# Patient Record
Sex: FEMALE | Race: WHITE | NOT HISPANIC OR LATINO | Employment: OTHER | URBAN - METROPOLITAN AREA
[De-identification: names, ages, dates, MRNs, and addresses within clinical notes are randomized per-mention and may not be internally consistent; named-entity substitution may affect disease eponyms.]

---

## 2023-08-23 ENCOUNTER — HOSPITAL ENCOUNTER (OUTPATIENT)
Dept: DATA CONVERSION | Facility: HOSPITAL | Age: 73
Discharge: HOME | End: 2023-08-23
Payer: MEDICARE

## 2023-08-23 DIAGNOSIS — J02.8 ACUTE PHARYNGITIS DUE TO OTHER SPECIFIED ORGANISMS: ICD-10-CM

## 2023-08-23 LAB
BACTERIA SPEC CULT: NORMAL
REPORT STATUS -LH SQ DATA CONVERSION: NORMAL
SERVICE CMNT-IMP: NORMAL
SPECIMEN SOURCE: NORMAL

## 2023-09-15 VITALS
BODY MASS INDEX: 38.16 KG/M2 | SYSTOLIC BLOOD PRESSURE: 152 MMHG | HEART RATE: 106 BPM | WEIGHT: 215.4 LBS | DIASTOLIC BLOOD PRESSURE: 78 MMHG | RESPIRATION RATE: 16 BRPM | HEIGHT: 63 IN | OXYGEN SATURATION: 95 % | TEMPERATURE: 100.8 F

## 2023-11-03 ENCOUNTER — TELEPHONE (OUTPATIENT)
Dept: PRIMARY CARE | Facility: CLINIC | Age: 73
End: 2023-11-03
Payer: MEDICARE

## 2023-11-03 NOTE — TELEPHONE ENCOUNTER
Pt called requesting zpak due to sinus congestion, ears hurting and blocked and sore throat. Dr. Tang stated ok. Rx verbally called into [facility] pt aware

## 2023-12-20 ENCOUNTER — OFFICE VISIT (OUTPATIENT)
Dept: DERMATOLOGY | Facility: CLINIC | Age: 73
End: 2023-12-20
Payer: MEDICARE

## 2023-12-20 DIAGNOSIS — L57.0 ACTINIC KERATOSIS: ICD-10-CM

## 2023-12-20 DIAGNOSIS — D48.5 NEOPLASM OF UNCERTAIN BEHAVIOR OF SKIN: Primary | ICD-10-CM

## 2023-12-20 DIAGNOSIS — L82.1 SEBORRHEIC KERATOSIS: ICD-10-CM

## 2023-12-20 DIAGNOSIS — D22.5 MELANOCYTIC NEVUS OF TRUNK: ICD-10-CM

## 2023-12-20 DIAGNOSIS — L57.8 DIFFUSE PHOTODAMAGE OF SKIN: ICD-10-CM

## 2023-12-20 DIAGNOSIS — L21.9 SEBORRHEIC DERMATITIS: ICD-10-CM

## 2023-12-20 PROCEDURE — 1126F AMNT PAIN NOTED NONE PRSNT: CPT | Performed by: DERMATOLOGY

## 2023-12-20 PROCEDURE — 99204 OFFICE O/P NEW MOD 45 MIN: CPT | Performed by: DERMATOLOGY

## 2023-12-20 PROCEDURE — 1159F MED LIST DOCD IN RCRD: CPT | Performed by: DERMATOLOGY

## 2023-12-20 PROCEDURE — 17003 DESTRUCT PREMALG LES 2-14: CPT | Performed by: DERMATOLOGY

## 2023-12-20 PROCEDURE — 1125F AMNT PAIN NOTED PAIN PRSNT: CPT | Performed by: DERMATOLOGY

## 2023-12-20 PROCEDURE — 17000 DESTRUCT PREMALG LESION: CPT | Performed by: DERMATOLOGY

## 2023-12-20 PROCEDURE — 88305 TISSUE EXAM BY PATHOLOGIST: CPT | Mod: TC,DER | Performed by: DERMATOLOGY

## 2023-12-20 PROCEDURE — 88305 TISSUE EXAM BY PATHOLOGIST: CPT | Performed by: DERMATOLOGY

## 2023-12-20 PROCEDURE — 11301 SHAVE SKIN LESION 0.6-1.0 CM: CPT | Performed by: DERMATOLOGY

## 2023-12-20 PROCEDURE — 1036F TOBACCO NON-USER: CPT | Performed by: DERMATOLOGY

## 2023-12-20 RX ORDER — KETOCONAZOLE 20 MG/G
CREAM TOPICAL 2 TIMES DAILY
Qty: 60 G | Refills: 11 | Status: SHIPPED | OUTPATIENT
Start: 2023-12-20 | End: 2024-02-27 | Stop reason: ALTCHOICE

## 2023-12-20 RX ORDER — ESTRADIOL 10 UG/1
INSERT VAGINAL
COMMUNITY
Start: 2019-03-05

## 2023-12-20 RX ORDER — SULFAMETHOXAZOLE AND TRIMETHOPRIM 800; 160 MG/1; MG/1
TABLET ORAL EVERY 24 HOURS
COMMUNITY
Start: 2010-01-22

## 2023-12-20 RX ORDER — FEXOFENADINE HCL 60 MG
1 TABLET ORAL DAILY
COMMUNITY
Start: 2016-09-07

## 2023-12-20 RX ORDER — ASPIRIN 325 MG
50000 TABLET, DELAYED RELEASE (ENTERIC COATED) ORAL
COMMUNITY
Start: 2023-12-07

## 2023-12-20 RX ORDER — EPINEPHRINE 0.3 MG/.3ML
0.3 INJECTION INTRAMUSCULAR
COMMUNITY
Start: 2017-03-30

## 2023-12-20 RX ORDER — TRIAMCINOLONE ACETONIDE 5 MG/G
CREAM TOPICAL EVERY 12 HOURS
COMMUNITY
Start: 2023-07-24

## 2023-12-20 ASSESSMENT — DERMATOLOGY PATIENT ASSESSMENT
ARE YOU ON BIRTH CONTROL: NO
DO YOU USE SUNSCREEN: DAILY
DO YOU USE A TANNING BED: NO
DO YOU HAVE ANY NEW OR CHANGING LESIONS: NO
ARE YOU TRYING TO GET PREGNANT: NO
DO YOU HAVE IRREGULAR MENSTRUAL CYCLES: NO

## 2023-12-20 ASSESSMENT — DERMATOLOGY QUALITY OF LIFE (QOL) ASSESSMENT: ARE THERE EXCLUSIONS OR EXCEPTIONS FOR THE QUALITY OF LIFE ASSESSMENT: NO

## 2023-12-20 ASSESSMENT — PATIENT GLOBAL ASSESSMENT (PGA): WHAT IS THE PGA: PATIENT GLOBAL ASSESSMENT:  3 - MODERATE

## 2023-12-22 LAB
LABORATORY COMMENT REPORT: NORMAL
PATH REPORT.FINAL DX SPEC: NORMAL
PATH REPORT.GROSS SPEC: NORMAL
PATH REPORT.MICROSCOPIC SPEC OTHER STN: NORMAL
PATH REPORT.RELEVANT HX SPEC: NORMAL
PATH REPORT.TOTAL CANCER: NORMAL

## 2023-12-26 ENCOUNTER — OFFICE VISIT (OUTPATIENT)
Dept: PRIMARY CARE | Facility: CLINIC | Age: 73
End: 2023-12-26
Payer: MEDICARE

## 2023-12-26 VITALS — OXYGEN SATURATION: 98 % | HEART RATE: 90 BPM | SYSTOLIC BLOOD PRESSURE: 168 MMHG | DIASTOLIC BLOOD PRESSURE: 100 MMHG

## 2023-12-26 DIAGNOSIS — R30.0 DYSURIA: Primary | ICD-10-CM

## 2023-12-26 DIAGNOSIS — N30.01 ACUTE CYSTITIS WITH HEMATURIA: ICD-10-CM

## 2023-12-26 LAB
POC APPEARANCE, URINE: ABNORMAL
POC BILIRUBIN, URINE: ABNORMAL
POC BLOOD, URINE: ABNORMAL
POC COLOR, URINE: ABNORMAL
POC GLUCOSE, URINE: ABNORMAL MG/DL
POC KETONES, URINE: NEGATIVE MG/DL
POC LEUKOCYTES, URINE: ABNORMAL
POC NITRITE,URINE: POSITIVE
POC PH, URINE: 5.5 PH
POC PROTEIN, URINE: ABNORMAL MG/DL
POC SPECIFIC GRAVITY, URINE: >=1.03
POC UROBILINOGEN, URINE: 1 EU/DL

## 2023-12-26 PROCEDURE — 1125F AMNT PAIN NOTED PAIN PRSNT: CPT | Performed by: PHYSICIAN ASSISTANT

## 2023-12-26 PROCEDURE — 99213 OFFICE O/P EST LOW 20 MIN: CPT | Performed by: PHYSICIAN ASSISTANT

## 2023-12-26 PROCEDURE — 1036F TOBACCO NON-USER: CPT | Performed by: PHYSICIAN ASSISTANT

## 2023-12-26 PROCEDURE — 81003 URINALYSIS AUTO W/O SCOPE: CPT | Performed by: PHYSICIAN ASSISTANT

## 2023-12-26 PROCEDURE — 1160F RVW MEDS BY RX/DR IN RCRD: CPT | Performed by: PHYSICIAN ASSISTANT

## 2023-12-26 PROCEDURE — 1159F MED LIST DOCD IN RCRD: CPT | Performed by: PHYSICIAN ASSISTANT

## 2023-12-26 RX ORDER — NITROFURANTOIN 25; 75 MG/1; MG/1
100 CAPSULE ORAL 2 TIMES DAILY
Qty: 14 CAPSULE | Refills: 0 | Status: SHIPPED | OUTPATIENT
Start: 2023-12-26 | End: 2023-12-27 | Stop reason: SINTOL

## 2023-12-26 ASSESSMENT — LIFESTYLE VARIABLES
HOW OFTEN DO YOU HAVE SIX OR MORE DRINKS ON ONE OCCASION: NEVER
HOW MANY STANDARD DRINKS CONTAINING ALCOHOL DO YOU HAVE ON A TYPICAL DAY: PATIENT DOES NOT DRINK
SKIP TO QUESTIONS 9-10: 1
AUDIT-C TOTAL SCORE: 0
HOW OFTEN DO YOU HAVE A DRINK CONTAINING ALCOHOL: NEVER

## 2023-12-26 ASSESSMENT — ENCOUNTER SYMPTOMS
LOSS OF SENSATION IN FEET: 0
OCCASIONAL FEELINGS OF UNSTEADINESS: 0
DEPRESSION: 0

## 2023-12-26 ASSESSMENT — PATIENT HEALTH QUESTIONNAIRE - PHQ9
1. LITTLE INTEREST OR PLEASURE IN DOING THINGS: NOT AT ALL
SUM OF ALL RESPONSES TO PHQ9 QUESTIONS 1 AND 2: 0
2. FEELING DOWN, DEPRESSED OR HOPELESS: NOT AT ALL

## 2023-12-26 ASSESSMENT — PAIN SCALES - GENERAL: PAINLEVEL: 10-WORST PAIN EVER

## 2023-12-26 NOTE — PROGRESS NOTES
Subjective   Patient ID:   Odette Nicholson is a 73 y.o. female who presents for UTI.  HPI  Dr. Tang patient.  Here with acute symptoms:  Symptoms x a couple of days.  Frequency.  Burning.  States she knows she has a UTI.  Urine today shows leukocytes, nitrites, blood.  Taking Azo.    Review of Systems  12 point review of systems negative unless stated above in HPI    Vitals:    12/26/23 1130   BP: (!) 168/100   Pulse: 90   SpO2: 98%     Physical Exam  General: Alert and oriented, well nourished, no acute distress.  Lungs: Clear to auscultation, non-labored respiration.  Heart: Normal rate, regular rhythm, no murmur, gallop or edema.  Neurologic: Awake, alert, and oriented X3, CN II-XII intact.  Psychiatric: Cooperative, appropriate mood and affect.    Assessment/Plan   You appear to have a urinary tract infection based on your urine results.  I have sent in the antibiotic Macrobid to your pharmacy.  Unfortunately, there is not enough urine to send for a culture.  Increase water intake.  Call if symptoms worsen or do not improve.    F/u  With Dr. Tang  Diagnoses and all orders for this visit:  Dysuria  -     POCT UA Automated manually resulted  Acute cystitis with hematuria  -     nitrofurantoin, macrocrystal-monohydrate, (Macrobid) 100 mg capsule; Take 1 capsule (100 mg) by mouth 2 times a day for 7 days.

## 2023-12-27 DIAGNOSIS — N30.01 ACUTE CYSTITIS WITH HEMATURIA: Primary | ICD-10-CM

## 2023-12-27 RX ORDER — TETRACYCLINE HYDROCHLORIDE 500 MG/1
500 CAPSULE ORAL 2 TIMES DAILY
Qty: 14 CAPSULE | Refills: 0 | Status: SHIPPED | OUTPATIENT
Start: 2023-12-27 | End: 2024-01-03

## 2024-01-05 DIAGNOSIS — E07.9 THYROID LUMP: ICD-10-CM

## 2024-01-10 ENCOUNTER — TELEPHONE (OUTPATIENT)
Dept: DERMATOLOGY | Facility: CLINIC | Age: 74
End: 2024-01-10

## 2024-01-10 ENCOUNTER — OFFICE VISIT (OUTPATIENT)
Dept: PRIMARY CARE | Facility: CLINIC | Age: 74
End: 2024-01-10
Payer: MEDICARE

## 2024-01-10 VITALS
OXYGEN SATURATION: 93 % | RESPIRATION RATE: 16 BRPM | HEIGHT: 63 IN | WEIGHT: 210 LBS | BODY MASS INDEX: 37.21 KG/M2 | HEART RATE: 74 BPM

## 2024-01-10 DIAGNOSIS — Z88.8 ALLERGY TO IODINE: ICD-10-CM

## 2024-01-10 DIAGNOSIS — C44.529 SQUAMOUS CELL CARCINOMA OF BACK: ICD-10-CM

## 2024-01-10 DIAGNOSIS — R73.9 HYPERGLYCEMIA: ICD-10-CM

## 2024-01-10 DIAGNOSIS — R10.84 GENERALIZED ABDOMINAL PAIN: Primary | ICD-10-CM

## 2024-01-10 DIAGNOSIS — E03.9 HYPOTHYROIDISM (ACQUIRED): ICD-10-CM

## 2024-01-10 DIAGNOSIS — Z13.6 SCREENING FOR CARDIOVASCULAR CONDITION: ICD-10-CM

## 2024-01-10 DIAGNOSIS — R35.0 URINARY FREQUENCY: ICD-10-CM

## 2024-01-10 LAB
POC APPEARANCE, URINE: CLEAR
POC BILIRUBIN, URINE: NEGATIVE
POC BLOOD, URINE: NEGATIVE
POC COLOR, URINE: YELLOW
POC GLUCOSE, URINE: NEGATIVE MG/DL
POC KETONES, URINE: NEGATIVE MG/DL
POC LEUKOCYTES, URINE: NEGATIVE
POC NITRITE,URINE: NEGATIVE
POC PH, URINE: 5 PH
POC PROTEIN, URINE: NEGATIVE MG/DL
POC SPECIFIC GRAVITY, URINE: 1.02
POC UROBILINOGEN, URINE: 1 EU/DL

## 2024-01-10 PROCEDURE — 1159F MED LIST DOCD IN RCRD: CPT | Performed by: INTERNAL MEDICINE

## 2024-01-10 PROCEDURE — 1126F AMNT PAIN NOTED NONE PRSNT: CPT | Performed by: INTERNAL MEDICINE

## 2024-01-10 PROCEDURE — 1036F TOBACCO NON-USER: CPT | Performed by: INTERNAL MEDICINE

## 2024-01-10 PROCEDURE — 99214 OFFICE O/P EST MOD 30 MIN: CPT | Performed by: INTERNAL MEDICINE

## 2024-01-10 RX ORDER — PREDNISONE 10 MG/1
40 TABLET ORAL DAILY
Qty: 8 TABLET | Refills: 0 | Status: SHIPPED | OUTPATIENT
Start: 2024-01-10 | End: 2024-01-12

## 2024-01-10 ASSESSMENT — ENCOUNTER SYMPTOMS
SINUS PAIN: 0
RHINORRHEA: 0
DIZZINESS: 0
CHILLS: 0
LOSS OF SENSATION IN FEET: 0
JOINT SWELLING: 0
ROS SKIN COMMENTS: MOLE ON BACK
COUGH: 0
NERVOUS/ANXIOUS: 0
CONSTIPATION: 0
PALPITATIONS: 0
FATIGUE: 0
FREQUENCY: 1
VOMITING: 0
APPETITE CHANGE: 0
DIARRHEA: 0
NAUSEA: 0
FEVER: 0
DIFFICULTY URINATING: 0
DEPRESSION: 0
SORE THROAT: 0
SLEEP DISTURBANCE: 0
OCCASIONAL FEELINGS OF UNSTEADINESS: 0
ARTHRALGIAS: 0
SHORTNESS OF BREATH: 0
ABDOMINAL PAIN: 1
WEAKNESS: 0
HEADACHES: 0
HEMATURIA: 0

## 2024-01-10 ASSESSMENT — PATIENT HEALTH QUESTIONNAIRE - PHQ9
1. LITTLE INTEREST OR PLEASURE IN DOING THINGS: NOT AT ALL
2. FEELING DOWN, DEPRESSED OR HOPELESS: NOT AT ALL
SUM OF ALL RESPONSES TO PHQ9 QUESTIONS 1 AND 2: 0

## 2024-01-10 ASSESSMENT — PAIN SCALES - GENERAL: PAINLEVEL: 0-NO PAIN

## 2024-01-10 NOTE — TELEPHONE ENCOUNTER
Pt left message that she wants her biopsy report sent to Dr Denilson See , Her PCP recommended Dr See to do surgery ?? Her appt is on 01/05/24  in [place] , I returned call to pt to let her know that Dr See is not a Mohs surgeon , she is aware and stated that he is going to do ED*C and asked if she is going to follow up with Dr Charles she stated no because Dr Tolbert office is much closer to her home . I recommended that Dr Tolbert office fax over request for bx report .    Patients PCP is Merry Tang MD --called Dr Tolbert office spoke with Shasha , she said they will fax over all information of pt treatment after her visit ..

## 2024-01-10 NOTE — PROGRESS NOTES
"Subjective   Patient ID: Odette Nicholson is a 73 y.o. female who presents for discuss dermatology skin biopsy results. She has squamous cell on her back and would like referral to new dermatologist for removal. Patient reports lower abdominal pressure and pain. Moves bowels daily. Hx of cholecystectomy and hysterectomy. She also reports gaining weight but all of her clothes still fit. Urinates frequently but drinks a lot of water. Denies nocturia.    Diagnostics Reviewed:  Labs Reviewed: 1/2024 UA normal         Review of Systems   Constitutional:  Negative for appetite change, chills, fatigue and fever.        Weight gain   HENT:  Negative for ear pain, rhinorrhea, sinus pain and sore throat.    Eyes:  Negative for visual disturbance.   Respiratory:  Negative for cough and shortness of breath.    Cardiovascular:  Negative for chest pain and palpitations.   Gastrointestinal:  Positive for abdominal pain. Negative for constipation, diarrhea, nausea and vomiting.   Genitourinary:  Positive for frequency. Negative for difficulty urinating and hematuria.   Musculoskeletal:  Negative for arthralgias and joint swelling.   Skin:  Negative for rash.        Mole on back   Neurological:  Negative for dizziness, weakness and headaches.   Psychiatric/Behavioral:  Negative for sleep disturbance. The patient is not nervous/anxious.        Objective   Pulse 74   Resp 16   Ht 1.6 m (5' 3\")   Wt 95.3 kg (210 lb)   SpO2 93%   BMI 37.20 kg/m²     Physical Exam  Eyes:      Conjunctiva/sclera: Conjunctivae normal.      Pupils: Pupils are equal, round, and reactive to light.   Neck:      Thyroid: No thyromegaly.      Vascular: No carotid bruit.   Cardiovascular:      Rate and Rhythm: Regular rhythm.      Heart sounds: Normal heart sounds.   Pulmonary:      Breath sounds: Normal breath sounds.   Abdominal:      General: Bowel sounds are normal.      Palpations: Abdomen is soft. There is no hepatomegaly.      Tenderness: There is " abdominal tenderness.   Lymphadenopathy:      Cervical: No cervical adenopathy.   Skin:     General: Skin is warm.      Comments: Suspicious mole on back   Neurological:      Mental Status: She is alert and oriented to person, place, and time.      Gait: Gait is intact.   Psychiatric:         Mood and Affect: Mood and affect normal.         Behavior: Behavior normal. Behavior is cooperative.         Cognition and Memory: Cognition normal.         Assessment/Plan   Diagnoses and all orders for this visit:  Generalized abdominal pain  -     CT abdomen pelvis w IV contrast; Future  -     CBC and Auto Differential; Future  -     Comprehensive Metabolic Panel; Future  -     Hemoglobin A1C; Future  -     TSH with reflex to Free T4 if abnormal; Future  -     Lipid Panel; Future  Urinary frequency  -     POCT UA (nonautomated) manually resulted  -     CBC and Auto Differential; Future  -     Comprehensive Metabolic Panel; Future  -     Hemoglobin A1C; Future  -     TSH with reflex to Free T4 if abnormal; Future  -     Lipid Panel; Future  Hypothyroidism (acquired)  -     CBC and Auto Differential; Future  -     Comprehensive Metabolic Panel; Future  -     Hemoglobin A1C; Future  -     TSH with reflex to Free T4 if abnormal; Future  -     Lipid Panel; Future  Screening for cardiovascular condition  -     CBC and Auto Differential; Future  -     Comprehensive Metabolic Panel; Future  -     Hemoglobin A1C; Future  -     TSH with reflex to Free T4 if abnormal; Future  -     Lipid Panel; Future  Hyperglycemia  -     CBC and Auto Differential; Future  -     Comprehensive Metabolic Panel; Future  -     Hemoglobin A1C; Future  -     TSH with reflex to Free T4 if abnormal; Future  -     Lipid Panel; Future  Squamous cell carcinoma of back  -     Referral to Dermatology      Scribe Attestation  By signing my name below, Cheyanne MELARA Scribe   attest that this documentation has been prepared under the direction and in the presence  of Angela Tang MD.

## 2024-01-11 ENCOUNTER — APPOINTMENT (OUTPATIENT)
Dept: RADIOLOGY | Facility: CLINIC | Age: 74
End: 2024-01-11
Payer: MEDICARE

## 2024-01-11 ENCOUNTER — APPOINTMENT (OUTPATIENT)
Dept: RADIOLOGY | Facility: HOSPITAL | Age: 74
End: 2024-01-11
Payer: MEDICARE

## 2024-01-16 ENCOUNTER — LAB (OUTPATIENT)
Dept: LAB | Facility: LAB | Age: 74
End: 2024-01-16
Payer: MEDICARE

## 2024-01-16 DIAGNOSIS — R35.0 URINARY FREQUENCY: ICD-10-CM

## 2024-01-16 DIAGNOSIS — E03.9 HYPOTHYROIDISM (ACQUIRED): ICD-10-CM

## 2024-01-16 DIAGNOSIS — R10.84 GENERALIZED ABDOMINAL PAIN: ICD-10-CM

## 2024-01-16 DIAGNOSIS — Z13.6 SCREENING FOR CARDIOVASCULAR CONDITION: ICD-10-CM

## 2024-01-16 DIAGNOSIS — R73.9 HYPERGLYCEMIA: ICD-10-CM

## 2024-01-16 LAB
ALBUMIN SERPL-MCNC: 4.5 G/DL (ref 3.5–5)
ALP BLD-CCNC: 124 U/L (ref 35–125)
ALT SERPL-CCNC: 19 U/L (ref 5–40)
ANION GAP SERPL CALC-SCNC: 15 MMOL/L
AST SERPL-CCNC: 19 U/L (ref 5–40)
BASOPHILS # BLD AUTO: 0.02 X10*3/UL (ref 0–0.1)
BASOPHILS NFR BLD AUTO: 0.3 %
BILIRUB SERPL-MCNC: 0.5 MG/DL (ref 0.1–1.2)
BUN SERPL-MCNC: 15 MG/DL (ref 8–25)
CALCIUM SERPL-MCNC: 9.3 MG/DL (ref 8.5–10.4)
CHLORIDE SERPL-SCNC: 102 MMOL/L (ref 97–107)
CHOLEST SERPL-MCNC: 209 MG/DL (ref 133–200)
CHOLEST/HDLC SERPL: 3.4 {RATIO}
CO2 SERPL-SCNC: 25 MMOL/L (ref 24–31)
CREAT SERPL-MCNC: 0.9 MG/DL (ref 0.4–1.6)
EGFRCR SERPLBLD CKD-EPI 2021: 68 ML/MIN/1.73M*2
EOSINOPHIL # BLD AUTO: 0.06 X10*3/UL (ref 0–0.4)
EOSINOPHIL NFR BLD AUTO: 1 %
ERYTHROCYTE [DISTWIDTH] IN BLOOD BY AUTOMATED COUNT: 15.6 % (ref 11.5–14.5)
EST. AVERAGE GLUCOSE BLD GHB EST-MCNC: 114 MG/DL
GLUCOSE SERPL-MCNC: 100 MG/DL (ref 65–99)
HBA1C MFR BLD: 5.6 %
HCT VFR BLD AUTO: 44.8 % (ref 36–46)
HDLC SERPL-MCNC: 62 MG/DL
HGB BLD-MCNC: 14.4 G/DL (ref 12–16)
IMM GRANULOCYTES # BLD AUTO: 0.02 X10*3/UL (ref 0–0.5)
IMM GRANULOCYTES NFR BLD AUTO: 0.3 % (ref 0–0.9)
LDLC SERPL CALC-MCNC: 121 MG/DL (ref 65–130)
LYMPHOCYTES # BLD AUTO: 1.73 X10*3/UL (ref 0.8–3)
LYMPHOCYTES NFR BLD AUTO: 30.2 %
MCH RBC QN AUTO: 25.9 PG (ref 26–34)
MCHC RBC AUTO-ENTMCNC: 32.1 G/DL (ref 32–36)
MCV RBC AUTO: 81 FL (ref 80–100)
MONOCYTES # BLD AUTO: 0.62 X10*3/UL (ref 0.05–0.8)
MONOCYTES NFR BLD AUTO: 10.8 %
NEUTROPHILS # BLD AUTO: 3.28 X10*3/UL (ref 1.6–5.5)
NEUTROPHILS NFR BLD AUTO: 57.4 %
NRBC BLD-RTO: 0 /100 WBCS (ref 0–0)
PLATELET # BLD AUTO: 256 X10*3/UL (ref 150–450)
POTASSIUM SERPL-SCNC: 4.1 MMOL/L (ref 3.4–5.1)
PROT SERPL-MCNC: 6.8 G/DL (ref 5.9–7.9)
RBC # BLD AUTO: 5.56 X10*6/UL (ref 4–5.2)
SODIUM SERPL-SCNC: 142 MMOL/L (ref 133–145)
TRIGL SERPL-MCNC: 131 MG/DL (ref 40–150)
TSH SERPL DL<=0.05 MIU/L-ACNC: 1.27 MIU/L (ref 0.27–4.2)
WBC # BLD AUTO: 5.7 X10*3/UL (ref 4.4–11.3)

## 2024-01-16 PROCEDURE — 80061 LIPID PANEL: CPT

## 2024-01-16 PROCEDURE — 36415 COLL VENOUS BLD VENIPUNCTURE: CPT

## 2024-01-16 PROCEDURE — 83036 HEMOGLOBIN GLYCOSYLATED A1C: CPT

## 2024-01-16 PROCEDURE — 85025 COMPLETE CBC W/AUTO DIFF WBC: CPT

## 2024-01-16 PROCEDURE — 80053 COMPREHEN METABOLIC PANEL: CPT

## 2024-01-16 PROCEDURE — 84443 ASSAY THYROID STIM HORMONE: CPT

## 2024-01-17 ENCOUNTER — HOSPITAL ENCOUNTER (OUTPATIENT)
Dept: RADIOLOGY | Facility: HOSPITAL | Age: 74
Discharge: HOME | End: 2024-01-17
Payer: MEDICARE

## 2024-01-17 DIAGNOSIS — R10.84 GENERALIZED ABDOMINAL PAIN: ICD-10-CM

## 2024-01-17 PROCEDURE — 74177 CT ABD & PELVIS W/CONTRAST: CPT

## 2024-01-17 PROCEDURE — 2550000001 HC RX 255 CONTRASTS: Performed by: INTERNAL MEDICINE

## 2024-01-17 RX ADMIN — IOHEXOL 75 ML: 350 INJECTION, SOLUTION INTRAVENOUS at 10:16

## 2024-01-18 NOTE — RESULT ENCOUNTER NOTE
CT abdomen looks okay except she has fatty liver, which can improve with weight loss and limiting calories.  How is she feeling?

## 2024-02-27 ENCOUNTER — OFFICE VISIT (OUTPATIENT)
Dept: ENDOCRINOLOGY | Facility: CLINIC | Age: 74
End: 2024-02-27
Payer: MEDICARE

## 2024-02-27 VITALS
BODY MASS INDEX: 37.7 KG/M2 | HEIGHT: 63 IN | DIASTOLIC BLOOD PRESSURE: 70 MMHG | HEART RATE: 76 BPM | RESPIRATION RATE: 16 BRPM | WEIGHT: 212.8 LBS | SYSTOLIC BLOOD PRESSURE: 120 MMHG

## 2024-02-27 DIAGNOSIS — E89.0 POSTOPERATIVE HYPOTHYROIDISM: ICD-10-CM

## 2024-02-27 DIAGNOSIS — E04.2 MULTINODULAR GOITER: Primary | ICD-10-CM

## 2024-02-27 DIAGNOSIS — R73.03 PREDIABETES: ICD-10-CM

## 2024-02-27 PROCEDURE — 1160F RVW MEDS BY RX/DR IN RCRD: CPT | Performed by: INTERNAL MEDICINE

## 2024-02-27 PROCEDURE — 99204 OFFICE O/P NEW MOD 45 MIN: CPT | Performed by: INTERNAL MEDICINE

## 2024-02-27 PROCEDURE — 1159F MED LIST DOCD IN RCRD: CPT | Performed by: INTERNAL MEDICINE

## 2024-02-27 PROCEDURE — 1126F AMNT PAIN NOTED NONE PRSNT: CPT | Performed by: INTERNAL MEDICINE

## 2024-02-27 PROCEDURE — 1036F TOBACCO NON-USER: CPT | Performed by: INTERNAL MEDICINE

## 2024-02-27 ASSESSMENT — ENCOUNTER SYMPTOMS
COUGH: 0
NAUSEA: 0
SHORTNESS OF BREATH: 0
FEVER: 0
PALPITATIONS: 0
FATIGUE: 0
DIARRHEA: 0
VOMITING: 0
HEADACHES: 0
CHILLS: 0

## 2024-02-27 NOTE — PROGRESS NOTES
Endocrinology New Patient Consult  Subjective   Patient ID: Odette Nicholson is a 73 y.o. female who presents for Hypothyroidism.    PCP: Angela Tang MD    HPI  73-year-old self-referred for evaluation of hypothyroidism also with complaints of weight gain and difficulty losing weight.  She has been hypothyroid since 1992 when she had a hemithyroidectomy for benign nodular disease.  She believes she had a left hemithyroidectomy.  She was initially tried on levothyroxine but has a extreme dairy intolerance so was switched to [place] which she has been doing well on since.  She has been on the same dose of 60 mg daily since that time.  She takes it in the morning on an empty stomach.  She has gained about 20 pounds in the last year but reports that her clothing still fits her which she is confused by.  She tries to watch her eating habits very closely as she and her  work very hard to eat healthy.  She is currently following a gluten-free diet which she does feel better on.  She has been trying to watch her calories but has very limited exercise due to significant back issues.  She has done PT in the past and plans to look into water therapy.  She does not sleep well which is a chronic issue.  She has had some hoarseness on and off and denies any repeat ultrasound since surgery.      Review of Systems   Constitutional:  Negative for chills, fatigue and fever.   Respiratory:  Negative for cough and shortness of breath.    Cardiovascular:  Negative for chest pain and palpitations.   Gastrointestinal:  Negative for diarrhea, nausea and vomiting.   Neurological:  Negative for headaches.           Past Medical History:   Diagnosis Date    Unspecified cataract     Cataracts, both eyes    Vitreous degeneration, right eye     PVD (posterior vitreous detachment), right eye   Fatty liver   hypothyroidism   prediabetes    Past Surgical History:   Procedure Laterality Date    HAND SURGERY  06/04/2013    Hand Surgery                                                                                                                                                           HYSTERECTOMY  06/04/2013    Hysterectomy    TONSILLECTOMY  06/04/2013    Tonsillectomy   Hemithyroidectomy  Cholecystectomy    Social History     Tobacco Use   Smoking Status Never    Passive exposure: Never   Smokeless Tobacco Never      Social History     Substance and Sexual Activity   Alcohol Use Never      Marital status:   Employment: Retired    Family history  Paternal grandfather thyroid issue    Home Meds:  Current Outpatient Medications   Medication Instructions    [place] Thyroid 60 mg tablet Every 24 hours    cholecalciferol (VITAMIN D-3) 50,000 Units, oral, Weekly    EpiPen 2-Eliecer 0.3 mg, intramuscular, As Directed    fexofenadine (Allegra Allergy) 60 mg tablet 1 tablet, oral, Daily    ketoconazole (NIZOral) 2 % cream Topical, 2 times daily, To affected areas of face    triamcinolone (Kenalog) 0.5 % cream Every 12 hours    Vagifem 10 mcg tablet vaginal tablet 1 tablet Vaginal 3 times a Week for 90 day(s)        Allergies   Allergen Reactions    Latex Anaphylaxis     breathing difficulties, rash    Penicillins Anaphylaxis     quit breathing    Shellfish Derived Anaphylaxis    Acetaminophen Unknown    Antipyrine-Benzocaine Swelling    Bacitracin Unknown    Carbamide Peroxide Swelling    Diphenhydramine Hcl Unknown    Ibuprofen Unknown    Iodine Unknown    Nsaids (Non-Steroidal Anti-Inflammatory Drug) Headache    Neomycin Rash    Sulfa (Sulfonamide Antibiotics) Rash     breathing problems and rash        Objective   Vitals:    02/27/24 1303   BP: 120/70   Pulse: 76   Resp: 16      Vitals:    02/27/24 1303   Weight: 96.5 kg (212 lb 12.8 oz)      Body mass index is 37.7 kg/m².   Physical Exam  Constitutional:       Appearance: Normal appearance. She is overweight.   HENT:      Head: Normocephalic and atraumatic.   Neck:      Thyroid: No thyroid mass,  "thyromegaly or thyroid tenderness.      Comments: Difficult to palpate thyroid gland secondary to body habitus and scarring  Cardiovascular:      Rate and Rhythm: Normal rate and regular rhythm.      Heart sounds: No murmur heard.     No gallop.   Pulmonary:      Effort: Pulmonary effort is normal.      Breath sounds: Normal breath sounds.   Abdominal:      Palpations: Abdomen is soft.      Comments: benign   Neurological:      General: No focal deficit present.      Mental Status: She is alert and oriented to person, place, and time.      Deep Tendon Reflexes: Reflexes are normal and symmetric.   Psychiatric:         Behavior: Behavior is cooperative.         Labs:  Lab Results   Component Value Date    HGBA1C 5.6 01/16/2024    TSH 1.27 01/16/2024    FREET4 0.90 07/02/2019      No results found for: \"PR1\", \"THYROIDPAB\", \"TSI\"     Assessment/Plan   Problem List Items Addressed This Visit       Postoperative hypothyroidism    Prediabetes    Multinodular goiter - Primary    Relevant Orders    US thyroid     73-year-old here for evaluation of multinodular goiter hypothyroidism also with prediabetes and difficulty losing weight.  We reviewed her course in detail.  She did have recent thyroid blood work in January that was completely normal.  I encouraged her to continue her current medication dose.  We reviewed her A1c and the importance of continuing her efforts with her eating habits but I encouraged her to try to increase activity.  We did review weight loss medications and metformin in detail today.  She is not open to starting medication at this time which I think is reasonable.  We did review her history of remote thyroidectomy and I would recommend a repeat ultrasound of her remaining gland.  Further plans will be to based on ultrasound results.  I encouraged her to call or message with any concerns or questions  Electronically signed by:  Pippa Maya MD 02/27/24  10:14 PM    "

## 2024-02-28 NOTE — PATIENT INSTRUCTIONS
Please schedule thyroid ultrasound  Continue current thyroid medication  Continue to work on low-carb high-protein diet and increasing activity  Further plans based on ultrasound results  Please call or message with concerns or questions

## 2024-03-05 ENCOUNTER — HOSPITAL ENCOUNTER (OUTPATIENT)
Dept: RADIOLOGY | Facility: HOSPITAL | Age: 74
Discharge: HOME | End: 2024-03-05
Payer: MEDICARE

## 2024-03-05 DIAGNOSIS — E04.2 MULTINODULAR GOITER: ICD-10-CM

## 2024-03-05 PROCEDURE — 76536 US EXAM OF HEAD AND NECK: CPT

## 2024-03-07 ENCOUNTER — APPOINTMENT (OUTPATIENT)
Dept: ENDOCRINOLOGY | Facility: CLINIC | Age: 74
End: 2024-03-07
Payer: MEDICARE

## 2024-03-08 DIAGNOSIS — R13.12 OROPHARYNGEAL DYSPHAGIA: ICD-10-CM

## 2024-03-08 DIAGNOSIS — E04.2 MULTINODULAR GOITER: Primary | ICD-10-CM

## 2024-03-15 DIAGNOSIS — Z91.030 YELLOW JACKET STING ALLERGY: ICD-10-CM

## 2024-03-15 DIAGNOSIS — E04.2 MULTINODULAR GOITER: Primary | ICD-10-CM

## 2024-06-11 DIAGNOSIS — L30.9 CHRONIC ECZEMA: ICD-10-CM

## 2024-06-11 RX ORDER — TRIAMCINOLONE ACETONIDE 5 MG/G
CREAM TOPICAL EVERY 12 HOURS
Qty: 15 G | Refills: 3 | Status: SHIPPED | OUTPATIENT
Start: 2024-06-11

## 2024-08-08 ENCOUNTER — OFFICE VISIT (OUTPATIENT)
Dept: PRIMARY CARE | Facility: CLINIC | Age: 74
End: 2024-08-08
Payer: MEDICARE

## 2024-08-08 VITALS
HEART RATE: 94 BPM | HEIGHT: 63 IN | WEIGHT: 210 LBS | DIASTOLIC BLOOD PRESSURE: 80 MMHG | SYSTOLIC BLOOD PRESSURE: 160 MMHG | OXYGEN SATURATION: 95 % | BODY MASS INDEX: 37.21 KG/M2 | RESPIRATION RATE: 16 BRPM

## 2024-08-08 DIAGNOSIS — N30.00 ACUTE CYSTITIS WITHOUT HEMATURIA: Primary | ICD-10-CM

## 2024-08-08 DIAGNOSIS — R30.0 DYSURIA: ICD-10-CM

## 2024-08-08 DIAGNOSIS — Z12.31 SCREENING MAMMOGRAM FOR BREAST CANCER: ICD-10-CM

## 2024-08-08 LAB
POC APPEARANCE, URINE: ABNORMAL
POC BILIRUBIN, URINE: ABNORMAL
POC BLOOD, URINE: ABNORMAL
POC COLOR, URINE: ABNORMAL
POC GLUCOSE, URINE: ABNORMAL MG/DL
POC KETONES, URINE: ABNORMAL MG/DL
POC LEUKOCYTES, URINE: ABNORMAL
POC NITRITE,URINE: POSITIVE
POC PH, URINE: 6 PH
POC PROTEIN, URINE: ABNORMAL MG/DL
POC SPECIFIC GRAVITY, URINE: 1.02
POC UROBILINOGEN, URINE: 1 EU/DL

## 2024-08-08 PROCEDURE — 99213 OFFICE O/P EST LOW 20 MIN: CPT | Performed by: INTERNAL MEDICINE

## 2024-08-08 PROCEDURE — 1159F MED LIST DOCD IN RCRD: CPT | Performed by: INTERNAL MEDICINE

## 2024-08-08 PROCEDURE — 1125F AMNT PAIN NOTED PAIN PRSNT: CPT | Performed by: INTERNAL MEDICINE

## 2024-08-08 PROCEDURE — 81002 URINALYSIS NONAUTO W/O SCOPE: CPT | Performed by: INTERNAL MEDICINE

## 2024-08-08 PROCEDURE — 3008F BODY MASS INDEX DOCD: CPT | Performed by: INTERNAL MEDICINE

## 2024-08-08 RX ORDER — NITROFURANTOIN 25; 75 MG/1; MG/1
100 CAPSULE ORAL 2 TIMES DAILY
Qty: 14 CAPSULE | Refills: 0 | Status: SHIPPED | OUTPATIENT
Start: 2024-08-08 | End: 2024-08-15

## 2024-08-08 ASSESSMENT — ENCOUNTER SYMPTOMS
ABDOMINAL PAIN: 0
DYSURIA: 1
OCCASIONAL FEELINGS OF UNSTEADINESS: 0
DIZZINESS: 0
CONSTIPATION: 0
DEPRESSION: 0
SHORTNESS OF BREATH: 0
COUGH: 0
NAUSEA: 0
DIARRHEA: 0
LOSS OF SENSATION IN FEET: 0
ARTHRALGIAS: 0
PALPITATIONS: 0

## 2024-08-08 ASSESSMENT — PATIENT HEALTH QUESTIONNAIRE - PHQ9
2. FEELING DOWN, DEPRESSED OR HOPELESS: NOT AT ALL
1. LITTLE INTEREST OR PLEASURE IN DOING THINGS: NOT AT ALL
SUM OF ALL RESPONSES TO PHQ9 QUESTIONS 1 AND 2: 0

## 2024-08-08 ASSESSMENT — PAIN SCALES - GENERAL: PAINLEVEL: 10-WORST PAIN EVER

## 2024-08-08 NOTE — PROGRESS NOTES
"Subjective   Patient ID: Odette Nicholson is a 74 y.o. female who presents for UTI.    Patient has been suffering from a UTI for the past couple of days. She has been drinking water and cranberry juice for treatment. These symptoms have been recurring since cholecystectomy. Denies seeing blood in urine.    Diagnostics Reviewed:  Labs Reviewed:         Review of Systems   Respiratory:  Negative for cough and shortness of breath.    Cardiovascular:  Negative for chest pain and palpitations.   Gastrointestinal:  Negative for abdominal pain, constipation, diarrhea and nausea.   Genitourinary:  Positive for dysuria.   Musculoskeletal:  Negative for arthralgias.   Neurological:  Negative for dizziness.       Objective   /80   Pulse 94   Resp 16   Ht 1.6 m (5' 3\")   Wt 95.3 kg (210 lb)   SpO2 95%   BMI 37.20 kg/m²     Physical Exam  Cardiovascular:      Rate and Rhythm: Normal rate and regular rhythm.      Heart sounds: Normal heart sounds.   Pulmonary:      Breath sounds: Normal breath sounds.   Abdominal:      Palpations: Abdomen is soft. There is no hepatomegaly.      Tenderness: There is no abdominal tenderness.   Musculoskeletal:      Right lower leg: No edema.      Left lower leg: No edema.   Neurological:      Mental Status: She is alert and oriented to person, place, and time.      Gait: Gait normal.   Psychiatric:         Mood and Affect: Mood normal.         Behavior: Behavior normal.         Assessment/Plan   Diagnoses and all orders for this visit:  Acute cystitis without hematuria  -     nitrofurantoin, macrocrystal-monohydrate, (Macrobid) 100 mg capsule; Take 1 capsule (100 mg) by mouth 2 times a day for 7 days.  Dysuria  -     POCT UA (nonautomated) manually resulted  Screening mammogram for breast cancer  -     BI mammo bilateral screening tomosynthesis       Scribe Attestation  By signing my name below, IKervin Scribe   attest that this documentation has been prepared under the " direction and in the presence of Angela Tang MD.

## 2024-08-22 ENCOUNTER — OFFICE VISIT (OUTPATIENT)
Dept: PRIMARY CARE | Facility: CLINIC | Age: 74
End: 2024-08-22
Payer: MEDICARE

## 2024-08-22 VITALS
BODY MASS INDEX: 37.21 KG/M2 | RESPIRATION RATE: 16 BRPM | OXYGEN SATURATION: 96 % | HEIGHT: 63 IN | WEIGHT: 210 LBS | DIASTOLIC BLOOD PRESSURE: 82 MMHG | HEART RATE: 83 BPM | SYSTOLIC BLOOD PRESSURE: 140 MMHG

## 2024-08-22 DIAGNOSIS — E53.8 B12 DEFICIENCY: ICD-10-CM

## 2024-08-22 DIAGNOSIS — E03.9 HYPOTHYROIDISM (ACQUIRED): ICD-10-CM

## 2024-08-22 DIAGNOSIS — R41.3 MEMORY LOSS: Primary | ICD-10-CM

## 2024-08-22 DIAGNOSIS — R73.9 HYPERGLYCEMIA: ICD-10-CM

## 2024-08-22 PROCEDURE — 1126F AMNT PAIN NOTED NONE PRSNT: CPT | Performed by: INTERNAL MEDICINE

## 2024-08-22 PROCEDURE — 1159F MED LIST DOCD IN RCRD: CPT | Performed by: INTERNAL MEDICINE

## 2024-08-22 PROCEDURE — 99213 OFFICE O/P EST LOW 20 MIN: CPT | Performed by: INTERNAL MEDICINE

## 2024-08-22 PROCEDURE — 3008F BODY MASS INDEX DOCD: CPT | Performed by: INTERNAL MEDICINE

## 2024-08-22 ASSESSMENT — ENCOUNTER SYMPTOMS
DEPRESSION: 0
LOSS OF SENSATION IN FEET: 0
OCCASIONAL FEELINGS OF UNSTEADINESS: 0

## 2024-08-22 ASSESSMENT — PAIN SCALES - GENERAL: PAINLEVEL: 0-NO PAIN

## 2024-08-22 NOTE — PROGRESS NOTES
"Subjective   Patient ID: Odette Nicholson is a 74 y.o. female who presents for Memory Loss.    Mentioned experiencing memory loss . She is a good speller and lately she has difficulty spelling words. She had cataract surgery L eye 1 yr ago and still cannot see well, since then she has difficulty processing. She sees an optometrist.  She deneis headache or dizziness. Paternal grandmother had dementia in her 60s    Diagnostics Reviewed:  Labs Reviewed:           Review of Systems   Respiratory:  Negative for cough and shortness of breath.    Cardiovascular:  Negative for chest pain and palpitations.   Gastrointestinal:  Negative for abdominal pain, constipation, diarrhea and nausea.   Musculoskeletal:  Negative for arthralgias.   Neurological:  Negative for dizziness.   Psychiatric/Behavioral:  The patient is not nervous/anxious.        Objective   /82   Pulse 83   Resp 16   Ht 1.6 m (5' 3\")   Wt 95.3 kg (210 lb)   SpO2 96%   BMI 37.20 kg/m²     Physical Exam  Cardiovascular:      Rate and Rhythm: Normal rate and regular rhythm.      Heart sounds: Normal heart sounds.   Pulmonary:      Breath sounds: Normal breath sounds.   Abdominal:      Palpations: There is no hepatomegaly.   Musculoskeletal:      Right lower leg: No edema.      Left lower leg: No edema.   Neurological:      Mental Status: She is alert and oriented to person, place, and time.      Gait: Gait normal.   Psychiatric:         Mood and Affect: Mood normal.         Behavior: Behavior normal.         Assessment/Plan   Diagnoses and all orders for this visit:  Memory loss  -     CBC and Auto Differential; Future  -     Comprehensive Metabolic Panel; Future  -     Vitamin B12; Future  -     Hemoglobin A1C; Future  -     TSH with reflex to Free T4 if abnormal; Future  -     Sedimentation Rate; Future  -     C-Reactive Protein; Future  -     Vitamin B1, Whole Blood; Future  -     MR brain w and wo IV contrast; Future  -     Referral to Neurology; " Future  Hypothyroidism (acquired)  -     CBC and Auto Differential; Future  -     Comprehensive Metabolic Panel; Future  -     Vitamin B12; Future  -     Hemoglobin A1C; Future  -     TSH with reflex to Free T4 if abnormal; Future  -     Sedimentation Rate; Future  -     C-Reactive Protein; Future  -     Vitamin B1, Whole Blood; Future  -     MR brain w and wo IV contrast; Future  -     Referral to Neurology; Future  B12 deficiency  -     CBC and Auto Differential; Future  -     Comprehensive Metabolic Panel; Future  -     Vitamin B12; Future  -     Hemoglobin A1C; Future  -     TSH with reflex to Free T4 if abnormal; Future  -     Sedimentation Rate; Future  -     C-Reactive Protein; Future  -     Vitamin B1, Whole Blood; Future  -     MR brain w and wo IV contrast; Future  -     Referral to Neurology; Future  Hyperglycemia  -     CBC and Auto Differential; Future  -     Comprehensive Metabolic Panel; Future  -     Vitamin B12; Future  -     Hemoglobin A1C; Future  -     TSH with reflex to Free T4 if abnormal; Future  -     Sedimentation Rate; Future  -     C-Reactive Protein; Future  -     Vitamin B1, Whole Blood; Future  -     MR brain w and wo IV contrast; Future  -     Referral to Neurology; Future

## 2024-08-28 ASSESSMENT — ENCOUNTER SYMPTOMS
DIZZINESS: 0
NERVOUS/ANXIOUS: 0
COUGH: 0
CONSTIPATION: 0
PALPITATIONS: 0
NAUSEA: 0
DIARRHEA: 0
ARTHRALGIAS: 0
ABDOMINAL PAIN: 0
SHORTNESS OF BREATH: 0

## 2024-09-10 ENCOUNTER — HOSPITAL ENCOUNTER (OUTPATIENT)
Dept: RADIOLOGY | Facility: CLINIC | Age: 74
Discharge: HOME | End: 2024-09-10
Payer: MEDICARE

## 2024-09-10 DIAGNOSIS — E53.8 B12 DEFICIENCY: ICD-10-CM

## 2024-09-10 DIAGNOSIS — E03.9 HYPOTHYROIDISM (ACQUIRED): ICD-10-CM

## 2024-09-10 DIAGNOSIS — R41.3 MEMORY LOSS: ICD-10-CM

## 2024-09-10 DIAGNOSIS — R73.9 HYPERGLYCEMIA: ICD-10-CM

## 2024-09-10 PROCEDURE — 70551 MRI BRAIN STEM W/O DYE: CPT | Performed by: RADIOLOGY

## 2024-09-10 PROCEDURE — 70551 MRI BRAIN STEM W/O DYE: CPT

## 2024-09-13 PROBLEM — E03.9 HYPOTHYROIDISM (ACQUIRED): Status: ACTIVE | Noted: 2024-09-13

## 2024-10-02 ENCOUNTER — LAB (OUTPATIENT)
Dept: LAB | Facility: LAB | Age: 74
End: 2024-10-02
Payer: MEDICARE

## 2024-10-02 ENCOUNTER — OFFICE VISIT (OUTPATIENT)
Dept: PRIMARY CARE | Facility: CLINIC | Age: 74
End: 2024-10-02
Payer: MEDICARE

## 2024-10-02 VITALS
RESPIRATION RATE: 16 BRPM | HEIGHT: 63 IN | WEIGHT: 213 LBS | DIASTOLIC BLOOD PRESSURE: 82 MMHG | SYSTOLIC BLOOD PRESSURE: 132 MMHG | BODY MASS INDEX: 37.74 KG/M2 | HEART RATE: 70 BPM | OXYGEN SATURATION: 96 %

## 2024-10-02 DIAGNOSIS — L29.9 ITCHING: ICD-10-CM

## 2024-10-02 DIAGNOSIS — E03.9 HYPOTHYROIDISM (ACQUIRED): ICD-10-CM

## 2024-10-02 DIAGNOSIS — J30.89 ALLERGIC RHINITIS DUE TO OTHER ALLERGIC TRIGGER, UNSPECIFIED SEASONALITY: ICD-10-CM

## 2024-10-02 DIAGNOSIS — R41.3 MEMORY LOSS: ICD-10-CM

## 2024-10-02 DIAGNOSIS — E53.8 B12 DEFICIENCY: ICD-10-CM

## 2024-10-02 DIAGNOSIS — R73.9 HYPERGLYCEMIA: ICD-10-CM

## 2024-10-02 DIAGNOSIS — L29.9 ITCHING: Primary | ICD-10-CM

## 2024-10-02 LAB
ALBUMIN SERPL BCP-MCNC: 4.4 G/DL (ref 3.4–5)
ALP SERPL-CCNC: 103 U/L (ref 33–136)
ALT SERPL W P-5'-P-CCNC: 16 U/L (ref 7–45)
ANION GAP SERPL CALCULATED.3IONS-SCNC: 11 MMOL/L (ref 10–20)
AST SERPL W P-5'-P-CCNC: 15 U/L (ref 9–39)
BASOPHILS # BLD AUTO: 0.02 X10*3/UL (ref 0–0.1)
BASOPHILS NFR BLD AUTO: 0.4 %
BILIRUB SERPL-MCNC: 0.5 MG/DL (ref 0–1.2)
BUN SERPL-MCNC: 14 MG/DL (ref 6–23)
CALCIUM SERPL-MCNC: 8.9 MG/DL (ref 8.6–10.3)
CHLORIDE SERPL-SCNC: 104 MMOL/L (ref 98–107)
CO2 SERPL-SCNC: 31 MMOL/L (ref 21–32)
CREAT SERPL-MCNC: 0.85 MG/DL (ref 0.5–1.05)
CRP SERPL-MCNC: 0.35 MG/DL
EGFRCR SERPLBLD CKD-EPI 2021: 72 ML/MIN/1.73M*2
EOSINOPHIL # BLD AUTO: 0.13 X10*3/UL (ref 0–0.4)
EOSINOPHIL NFR BLD AUTO: 2.5 %
ERYTHROCYTE [DISTWIDTH] IN BLOOD BY AUTOMATED COUNT: 17.2 % (ref 11.5–14.5)
ERYTHROCYTE [SEDIMENTATION RATE] IN BLOOD BY WESTERGREN METHOD: 10 MM/H (ref 0–30)
EST. AVERAGE GLUCOSE BLD GHB EST-MCNC: 108 MG/DL
GLUCOSE SERPL-MCNC: 97 MG/DL (ref 74–99)
HBA1C MFR BLD: 5.4 %
HCT VFR BLD AUTO: 42.8 % (ref 36–46)
HGB BLD-MCNC: 13.3 G/DL (ref 12–16)
IMM GRANULOCYTES # BLD AUTO: 0.02 X10*3/UL (ref 0–0.5)
IMM GRANULOCYTES NFR BLD AUTO: 0.4 % (ref 0–0.9)
LYMPHOCYTES # BLD AUTO: 1.51 X10*3/UL (ref 0.8–3)
LYMPHOCYTES NFR BLD AUTO: 28.8 %
MCH RBC QN AUTO: 25 PG (ref 26–34)
MCHC RBC AUTO-ENTMCNC: 31.1 G/DL (ref 32–36)
MCV RBC AUTO: 81 FL (ref 80–100)
MONOCYTES # BLD AUTO: 0.52 X10*3/UL (ref 0.05–0.8)
MONOCYTES NFR BLD AUTO: 9.9 %
NEUTROPHILS # BLD AUTO: 3.05 X10*3/UL (ref 1.6–5.5)
NEUTROPHILS NFR BLD AUTO: 58 %
NRBC BLD-RTO: 0 /100 WBCS (ref 0–0)
PLATELET # BLD AUTO: 247 X10*3/UL (ref 150–450)
POTASSIUM SERPL-SCNC: 4.4 MMOL/L (ref 3.5–5.3)
PROT SERPL-MCNC: 6.6 G/DL (ref 6.4–8.2)
RBC # BLD AUTO: 5.32 X10*6/UL (ref 4–5.2)
SODIUM SERPL-SCNC: 142 MMOL/L (ref 136–145)
TSH SERPL-ACNC: 1.95 MIU/L (ref 0.44–3.98)
VIT B12 SERPL-MCNC: 321 PG/ML (ref 211–911)
WBC # BLD AUTO: 5.3 X10*3/UL (ref 4.4–11.3)

## 2024-10-02 PROCEDURE — 99213 OFFICE O/P EST LOW 20 MIN: CPT | Performed by: INTERNAL MEDICINE

## 2024-10-02 PROCEDURE — 86038 ANTINUCLEAR ANTIBODIES: CPT

## 2024-10-02 PROCEDURE — 82607 VITAMIN B-12: CPT

## 2024-10-02 PROCEDURE — 3008F BODY MASS INDEX DOCD: CPT | Performed by: INTERNAL MEDICINE

## 2024-10-02 PROCEDURE — 1159F MED LIST DOCD IN RCRD: CPT | Performed by: INTERNAL MEDICINE

## 2024-10-02 PROCEDURE — 80053 COMPREHEN METABOLIC PANEL: CPT

## 2024-10-02 PROCEDURE — 84443 ASSAY THYROID STIM HORMONE: CPT

## 2024-10-02 PROCEDURE — 85025 COMPLETE CBC W/AUTO DIFF WBC: CPT

## 2024-10-02 PROCEDURE — 1126F AMNT PAIN NOTED NONE PRSNT: CPT | Performed by: INTERNAL MEDICINE

## 2024-10-02 PROCEDURE — 1158F ADVNC CARE PLAN TLK DOCD: CPT | Performed by: INTERNAL MEDICINE

## 2024-10-02 PROCEDURE — 86140 C-REACTIVE PROTEIN: CPT

## 2024-10-02 PROCEDURE — 85652 RBC SED RATE AUTOMATED: CPT

## 2024-10-02 PROCEDURE — 83036 HEMOGLOBIN GLYCOSYLATED A1C: CPT

## 2024-10-02 PROCEDURE — 36415 COLL VENOUS BLD VENIPUNCTURE: CPT

## 2024-10-02 PROCEDURE — 1123F ACP DISCUSS/DSCN MKR DOCD: CPT | Performed by: INTERNAL MEDICINE

## 2024-10-02 PROCEDURE — 84425 ASSAY OF VITAMIN B-1: CPT

## 2024-10-02 RX ORDER — CAMPHOR AND MENTHOL 5; 5 MG/ML; MG/ML
LOTION TOPICAL AS NEEDED
Qty: 222 ML | Refills: 0 | Status: SHIPPED | OUTPATIENT
Start: 2024-10-02 | End: 2025-10-02

## 2024-10-02 RX ORDER — CLOBETASOL PROPIONATE 0.5 MG/G
1 CREAM TOPICAL 2 TIMES DAILY
COMMUNITY
Start: 2024-09-11

## 2024-10-02 ASSESSMENT — PATIENT HEALTH QUESTIONNAIRE - PHQ9
2. FEELING DOWN, DEPRESSED OR HOPELESS: NOT AT ALL
SUM OF ALL RESPONSES TO PHQ9 QUESTIONS 1 AND 2: 0
1. LITTLE INTEREST OR PLEASURE IN DOING THINGS: NOT AT ALL

## 2024-10-02 ASSESSMENT — ENCOUNTER SYMPTOMS
DIZZINESS: 0
SHORTNESS OF BREATH: 0
CONSTIPATION: 0
LOSS OF SENSATION IN FEET: 0
ABDOMINAL PAIN: 0
ARTHRALGIAS: 0
NAUSEA: 0
OCCASIONAL FEELINGS OF UNSTEADINESS: 0
COUGH: 0
PALPITATIONS: 0
ROS SKIN COMMENTS: ITCHING
DEPRESSION: 0
DIARRHEA: 0

## 2024-10-02 ASSESSMENT — PAIN SCALES - GENERAL: PAINLEVEL: 0-NO PAIN

## 2024-10-02 NOTE — PROGRESS NOTES
"Subjective   Patient ID: Odette Nicholson is a 74 y.o. female who presents for Rash (On back).    Recently, noticed that she has developed an itching sensation that is on her back, arms and posterior region of neck. Applies moisturizer to skin. Issue began a month ago and has been constant since. Normally, showers everyday but has noticed that missing a daily shower exacerbates the itching. Takes vitamin D supplements and cranberry tablets. Adopted a gluten free diet in June.     Diagnostics Reviewed:  Labs Reviewed:           Review of Systems   Respiratory:  Negative for cough and shortness of breath.    Cardiovascular:  Negative for chest pain and palpitations.   Gastrointestinal:  Negative for abdominal pain, constipation, diarrhea and nausea.   Musculoskeletal:  Negative for arthralgias.   Skin:  Positive for rash.        itching   Neurological:  Negative for dizziness.       Objective   /82   Pulse 70   Resp 16   Ht 1.6 m (5' 3\")   Wt 96.6 kg (213 lb)   SpO2 96%   BMI 37.73 kg/m²     Physical Exam  Cardiovascular:      Rate and Rhythm: Normal rate and regular rhythm.      Heart sounds: Normal heart sounds.   Pulmonary:      Breath sounds: Normal breath sounds.   Abdominal:      Palpations: Abdomen is soft. There is no hepatomegaly.      Tenderness: There is no abdominal tenderness.   Musculoskeletal:      Right lower leg: No edema.      Left lower leg: No edema.   Neurological:      Mental Status: She is alert and oriented to person, place, and time.      Gait: Gait normal.   Psychiatric:         Mood and Affect: Mood normal.         Behavior: Behavior normal.         Assessment/Plan   Diagnoses and all orders for this visit:  Itching  -     LIU without Reflex PEDRO; Future  -     Referral to Allergy; Future  -     camphor-menthoL (Sarna OriginaL) lotion; Apply topically if needed for itching.  Hypothyroidism (acquired)  Allergic rhinitis due to other allergic trigger, unspecified " seasonality      Scribe Attestation  By signing my name below, I, Ubaldo Ortega   attest that this documentation has been prepared under the direction and in the presence of Angela Tang MD.

## 2024-10-03 LAB — ANA SER QL HEP2 SUBST: NEGATIVE

## 2024-10-08 LAB — VIT B1 PYROPHOSHATE BLD-SCNC: 103 NMOL/L (ref 70–180)

## 2024-11-04 ENCOUNTER — APPOINTMENT (OUTPATIENT)
Dept: ALLERGY | Facility: CLINIC | Age: 74
End: 2024-11-04
Payer: MEDICARE

## 2024-11-21 ENCOUNTER — OFFICE VISIT (OUTPATIENT)
Dept: PRIMARY CARE | Facility: CLINIC | Age: 74
End: 2024-11-21
Payer: MEDICARE

## 2024-11-21 VITALS
WEIGHT: 213 LBS | HEART RATE: 95 BPM | OXYGEN SATURATION: 95 % | BODY MASS INDEX: 37.74 KG/M2 | SYSTOLIC BLOOD PRESSURE: 140 MMHG | HEIGHT: 63 IN | RESPIRATION RATE: 16 BRPM | DIASTOLIC BLOOD PRESSURE: 80 MMHG

## 2024-11-21 DIAGNOSIS — L82.1 SEBORRHEIC KERATOSIS: Primary | ICD-10-CM

## 2024-11-21 DIAGNOSIS — L29.9 ITCHING: ICD-10-CM

## 2024-11-21 DIAGNOSIS — E03.9 HYPOTHYROIDISM (ACQUIRED): ICD-10-CM

## 2024-11-21 PROCEDURE — 1123F ACP DISCUSS/DSCN MKR DOCD: CPT | Performed by: INTERNAL MEDICINE

## 2024-11-21 PROCEDURE — 1159F MED LIST DOCD IN RCRD: CPT | Performed by: INTERNAL MEDICINE

## 2024-11-21 PROCEDURE — 99213 OFFICE O/P EST LOW 20 MIN: CPT | Performed by: INTERNAL MEDICINE

## 2024-11-21 PROCEDURE — 1158F ADVNC CARE PLAN TLK DOCD: CPT | Performed by: INTERNAL MEDICINE

## 2024-11-21 PROCEDURE — 3008F BODY MASS INDEX DOCD: CPT | Performed by: INTERNAL MEDICINE

## 2024-11-21 PROCEDURE — 1125F AMNT PAIN NOTED PAIN PRSNT: CPT | Performed by: INTERNAL MEDICINE

## 2024-11-21 PROCEDURE — G2211 COMPLEX E/M VISIT ADD ON: HCPCS | Performed by: INTERNAL MEDICINE

## 2024-11-21 RX ORDER — HYDROXYZINE HYDROCHLORIDE 25 MG/1
25 TABLET, FILM COATED ORAL EVERY 8 HOURS PRN
Qty: 90 TABLET | Refills: 11 | Status: SHIPPED | OUTPATIENT
Start: 2024-11-21 | End: 2025-11-16

## 2024-11-21 RX ORDER — HYDROCORTISONE 1 %
1 CREAM (GRAM) TOPICAL 2 TIMES DAILY
COMMUNITY

## 2024-11-21 RX ORDER — METHYLPREDNISOLONE 4 MG/1
TABLET ORAL
Qty: 21 TABLET | Refills: 0 | Status: SHIPPED | OUTPATIENT
Start: 2024-11-21 | End: 2024-11-28

## 2024-11-21 RX ORDER — DESOXIMETASONE 0.5 MG/G
1 CREAM TOPICAL 2 TIMES DAILY
COMMUNITY
Start: 2024-11-10

## 2024-11-21 ASSESSMENT — ENCOUNTER SYMPTOMS
DEPRESSION: 0
SHORTNESS OF BREATH: 0
PALPITATIONS: 0
DIZZINESS: 0
OCCASIONAL FEELINGS OF UNSTEADINESS: 0
COUGH: 0
ABDOMINAL PAIN: 0
CONSTIPATION: 0
NAUSEA: 0
ARTHRALGIAS: 0
LOSS OF SENSATION IN FEET: 0
DIARRHEA: 0

## 2024-11-21 ASSESSMENT — PAIN SCALES - GENERAL: PAINLEVEL_OUTOF10: 7

## 2024-11-21 NOTE — PROGRESS NOTES
"Subjective   Patient ID: Odette Nicholson is a 74 y.o. female who presents for Rash.    Patient has red spots that have formed on her lower back, scalp and on her R forearm which are constantly itchy. Itching is alleviated with being in colder weathers. Dermatologist told her that it could be an allergic reaction to the Covid booster. Is allergic to benadryl. Will be having some skin tests done soon.     Diagnostics Reviewed:  Labs Reviewed:      Rash  This is a new problem. The affected locations include the neck, back and abdomen. The rash is characterized by pain, redness and itchiness. She was exposed to chemicals. Pertinent negatives include no cough, diarrhea or shortness of breath.      Review of Systems   Respiratory:  Negative for cough and shortness of breath.    Cardiovascular:  Negative for chest pain and palpitations.   Gastrointestinal:  Negative for abdominal pain, constipation, diarrhea and nausea.   Musculoskeletal:  Negative for arthralgias.   Skin:  Positive for rash.   Neurological:  Negative for dizziness.        Itchiness     Objective   /80   Pulse 95   Resp 16   Ht 1.6 m (5' 3\")   Wt 96.6 kg (213 lb)   SpO2 95%   BMI 37.73 kg/m²     Physical Exam  Cardiovascular:      Rate and Rhythm: Normal rate and regular rhythm.      Heart sounds: Normal heart sounds.   Pulmonary:      Breath sounds: Normal breath sounds.   Abdominal:      Palpations: Abdomen is soft. There is no hepatomegaly.      Tenderness: There is no abdominal tenderness.   Musculoskeletal:      Right lower leg: No edema.      Left lower leg: No edema.   Skin:     Findings: Rash (Small erythematous macules on upper back, arms, multiple seborrheic keratosis lesions on back) present.   Neurological:      Mental Status: She is alert and oriented to person, place, and time.      Gait: Gait normal.   Psychiatric:         Mood and Affect: Mood normal.         Behavior: Behavior normal.       Assessment/Plan   Diagnoses and all " orders for this visit:  Seborrheic keratosis  Itching  -     hydrOXYzine HCL (Atarax) 25 mg tablet; Take 1 tablet (25 mg) by mouth every 8 hours if needed for itching.  -     methylPREDNISolone (Medrol Dospak) 4 mg tablets; Take as directed on package.  Hypothyroidism (acquired)      Scribe Attestation  By signing my name below, I, Zack Vallecillo, Ubaldo   attest that this documentation has been prepared under the direction and in the presence of Angela Tang MD.

## 2024-11-25 ENCOUNTER — APPOINTMENT (OUTPATIENT)
Dept: ALLERGY | Facility: CLINIC | Age: 74
End: 2024-11-25
Payer: MEDICARE

## 2024-11-25 VITALS — WEIGHT: 212 LBS | BODY MASS INDEX: 37.55 KG/M2

## 2024-11-25 DIAGNOSIS — L23.89 ALLERGIC CONTACT DERMATITIS DUE TO OTHER AGENTS: ICD-10-CM

## 2024-11-25 DIAGNOSIS — T63.451A TOXIC EFFECT OF VENOM OF HORNETS, UNINTENTIONAL, INITIAL ENCOUNTER: Primary | ICD-10-CM

## 2024-11-25 DIAGNOSIS — L29.9 ITCHING: ICD-10-CM

## 2024-11-25 PROCEDURE — G2211 COMPLEX E/M VISIT ADD ON: HCPCS | Performed by: ALLERGY & IMMUNOLOGY

## 2024-11-25 PROCEDURE — 99205 OFFICE O/P NEW HI 60 MIN: CPT | Performed by: ALLERGY & IMMUNOLOGY

## 2024-11-25 RX ORDER — EPINEPHRINE 2 MG/100UL
1 SPRAY NASAL ONCE AS NEEDED
Qty: 2 EACH | Refills: 5 | Status: SHIPPED | OUTPATIENT
Start: 2024-11-25

## 2024-11-25 NOTE — PROGRESS NOTES
Subjective   Patient ID:   [id number]   Odette Nicholson is a 74 y.o. female who presents for Dermatitis (Pt presents today for contact dermatitis. Pt thinks that is came from the Covid vaccine ).    Chief Complaint   Patient presents with    Dermatitis     Pt presents today for contact dermatitis. 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    Started with rash on 9/6/24 1 week after her covid booster  A week later, dermatitis on her back.  It then spread to the remainder of the body.    Saw derm 9/11/24, Dr. Henry See, who Dr. Tang recommended.  This patient has a history of seborrheic keratoses as well as history of significant sun exposure.    Rash started on her back and has spread to scalp, ears, abdomen, and hips. Has not spread lower than that.     While she has had the seborrheic keratoses for a long time, she reports that the rash on the back is NEW.    Various treatments have been prescribed and this patient reports that nothing is working.   She was given, by her primary care doctor, a Medrol pack given and she plans on starting it today.    There is no known cause for the rash.  However, she believes that she has the answer and that the COVID vaccination is causative. 10 days ago, saw dentist for a routine checkup, and mentioned the dermatitis; the dentist relayed that she has seen or heard of dermatitis starting after covid vaccinations, despite the brands.  Her  did research and found that this is a common mRNA vaccine adverse reaction.    Dermatology - saw Derm a couple weeks ago.   Triamcinolone - had worse symptoms, and had an allergic reaction to it.   Desoximetasone - working better.     Sent to Allergy for evaluation for Allergic Contact Dermatitis     Also she reports:  Venom sting - ED visit, given benadryl, in 2001 approximately.   Throat closing, trouble  breathing.   Now she carries an epipen.  Tried the yellow jacket shots but her allergy physician retired and she stopped the treatment.  Interested in desensitization.     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  [place] see Dermatology for skin checks q6mo.      Review of Systems   All other systems reviewed and are negative.        Objective     Wt 96.2 kg (212 lb)   BMI 37.55 kg/m²      Physical Exam  Constitutional:       General: She is not in acute distress.     Appearance: Normal appearance. She is not ill-appearing.   HENT:      Head: Normocephalic and atraumatic.      Right Ear: Tympanic membrane, ear canal and external ear normal.      Left Ear: Tympanic membrane, ear canal and external ear normal.      Nose: Nose normal. No congestion or rhinorrhea.      Mouth/Throat:      Mouth: Mucous membranes are moist.      Pharynx: Oropharynx is clear. No oropharyngeal exudate or posterior oropharyngeal erythema.   Eyes:      General:         Right eye: No discharge.         Left eye: No discharge.      Conjunctiva/sclera: Conjunctivae normal.   Cardiovascular:      Rate and Rhythm: Normal rate and regular rhythm.      Heart sounds: Normal heart sounds. No murmur heard.     No friction rub. No gallop.   Pulmonary:      Effort: Pulmonary effort is normal. No respiratory distress.      Breath sounds: Normal breath sounds. No stridor. No wheezing, rhonchi or rales.   Chest:      Chest wall: No tenderness.   Abdominal:      General: Abdomen is flat.      Palpations: Abdomen is soft.   Musculoskeletal:         General: Normal range of motion.      Cervical back: Normal range of motion and neck supple.   Lymphadenopathy:      Cervical: No cervical adenopathy.   Skin:     General: Skin is warm and dry.      Findings: Rash present. No erythema or lesion.      Comments: Multiple seborrheic keratoses seen on her back and similar tan, raised, lesions and a Vanna  tree formation.  There is no herald patch.  Excoriation is seen.  There is no vesicular rash.  I did not appreciate rash on her scalp.   Neurological:      General: No focal deficit present.      Mental Status: She is alert. Mental status is at baseline.   Psychiatric:         Mood and Affect: Mood normal.         Behavior: Behavior normal.         Thought Content: Thought content normal.         Judgment: Judgment normal.          Current Outpatient Medications   Medication Sig Dispense Refill    [place] Thyroid 60 mg tablet TAKE 1 TABLET DAILY ON AN EMPTY STOMACH 90 tablet 3    camphor-menthoL (Sarna OriginaL) lotion Apply topically if needed for itching. (Patient not taking: Reported on 11/21/2024) 222 mL 0    cholecalciferol (Vitamin D-3) 50,000 unit capsule TAKE 1 CAPSULE BY MOUTH EVERY WEEK 12 capsule 3    desoximetasone (Topicort) 0.05 % cream Apply 1 application. topically 2 times a day. (Patient not taking: Reported on 11/21/2024)      EPINEPHrine (EpiPen 2-Eliecer) 0.3 mg/0.3 mL injection syringe Inject 0.3 mL (0.3 mg) into the muscle. As Directed      eucerin (CeraVe) cream Apply 1 Application topically once daily as needed for dry skin.      fexofenadine (Allegra Allergy) 60 mg tablet Take 1 tablet (60 mg) by mouth once daily.      hydrocortisone 1 % cream Apply 1 Application topically 2 times a day.      hydrOXYzine HCL (Atarax) 25 mg tablet Take 1 tablet (25 mg) by mouth every 8 hours if needed for itching. 90 tablet 11    methylPREDNISolone (Medrol Dospak) 4 mg tablets Take as directed on package. 21 tablet 0    triamcinolone (Kenalog) 0.5 % cream Apply topically every 12 hours. (Patient not taking: Reported on 11/21/2024) 15 g 3    Vagifem 10 mcg tablet vaginal tablet Insert 1 tablet (10 mcg) into the vagina 3 (three) times a week. 36 tablet 3     No current facility-administered medications for this visit.       Summary of the labs over the past 6 months:    Lab on 10/02/2024   Component Date Value Ref  Range Status    WBC 10/02/2024 5.3  4.4 - 11.3 x10*3/uL Final    nRBC 10/02/2024 0.0  0.0 - 0.0 /100 WBCs Final    RBC 10/02/2024 5.32 (H)  4.00 - 5.20 x10*6/uL Final    Hemoglobin 10/02/2024 13.3  12.0 - 16.0 g/dL Final    Hematocrit 10/02/2024 42.8  36.0 - 46.0 % Final    MCV 10/02/2024 81  80 - 100 fL Final    MCH 10/02/2024 25.0 (L)  26.0 - 34.0 pg Final    MCHC 10/02/2024 31.1 (L)  32.0 - 36.0 g/dL Final    RDW 10/02/2024 17.2 (H)  11.5 - 14.5 % Final    Platelets 10/02/2024 247  150 - 450 x10*3/uL Final    Neutrophils % 10/02/2024 58.0  40.0 - 80.0 % Final    Immature Granulocytes %, Automated 10/02/2024 0.4  0.0 - 0.9 % Final    Lymphocytes % 10/02/2024 28.8  13.0 - 44.0 % Final    Monocytes % 10/02/2024 9.9  2.0 - 10.0 % Final    Eosinophils % 10/02/2024 2.5  0.0 - 6.0 % Final    Basophils % 10/02/2024 0.4  0.0 - 2.0 % Final    Neutrophils Absolute 10/02/2024 3.05  1.60 - 5.50 x10*3/uL Final    Immature Granulocytes Absolute, Au* 10/02/2024 0.02  0.00 - 0.50 x10*3/uL Final    Lymphocytes Absolute 10/02/2024 1.51  0.80 - 3.00 x10*3/uL Final    Monocytes Absolute 10/02/2024 0.52  0.05 - 0.80 x10*3/uL Final    Eosinophils Absolute 10/02/2024 0.13  0.00 - 0.40 x10*3/uL Final    Basophils Absolute 10/02/2024 0.02  0.00 - 0.10 x10*3/uL Final    Glucose 10/02/2024 97  74 - 99 mg/dL Final    Sodium 10/02/2024 142  136 - 145 mmol/L Final    Potassium 10/02/2024 4.4  3.5 - 5.3 mmol/L Final    Chloride 10/02/2024 104  98 - 107 mmol/L Final    Bicarbonate 10/02/2024 31  21 - 32 mmol/L Final    Anion Gap 10/02/2024 11  10 - 20 mmol/L Final    Urea Nitrogen 10/02/2024 14  6 - 23 mg/dL Final    Creatinine 10/02/2024 0.85  0.50 - 1.05 mg/dL Final    eGFR 10/02/2024 72  >60 mL/min/1.73m*2 Final    Calcium 10/02/2024 8.9  8.6 - 10.3 mg/dL Final    Albumin 10/02/2024 4.4  3.4 - 5.0 g/dL Final    Alkaline Phosphatase 10/02/2024 103  33 - 136 U/L Final    Total Protein 10/02/2024 6.6  6.4 - 8.2 g/dL Final    AST 10/02/2024 15  9  - 39 U/L Final    Bilirubin, Total 10/02/2024 0.5  0.0 - 1.2 mg/dL Final    ALT 10/02/2024 16  7 - 45 U/L Final    Vitamin B12 10/02/2024 321  211 - 911 pg/mL Final    Hemoglobin A1C 10/02/2024 5.4  See comment % Final    Estimated Average Glucose 10/02/2024 108  Not Established mg/dL Final    Thyroid Stimulating Hormone 10/02/2024 1.95  0.44 - 3.98 mIU/L Final    Sedimentation Rate 10/02/2024 10  0 - 30 mm/h Final    C-Reactive Protein 10/02/2024 0.35  <1.00 mg/dL Final    Vitamin B1, Whole Blood 10/02/2024 103  70 - 180 nmol/L Final    LIU 10/02/2024 Negative  Negative Final   Office Visit on 08/08/2024   Component Date Value Ref Range Status    POC Color, Urine 08/08/2024 Orange (A)  Straw, Yellow, Light-Yellow Final    POC Appearance, Urine 08/08/2024 Cloudy (A)  Clear Final    POC Glucose, Urine 08/08/2024 100 (1+) (A)  NEGATIVE mg/dl Final    POC Bilirubin, Urine 08/08/2024 SMALL (1+) (A)  NEGATIVE Final    POC Ketones, Urine 08/08/2024 TRACE (A)  NEGATIVE mg/dl Final    POC Specific Gravity, Urine 08/08/2024 1.020  1.005 - 1.035 Final    POC Blood, Urine 08/08/2024 LARGE (3+) (A)  NEGATIVE Final    POC PH, Urine 08/08/2024 6.0  No Reference Range Established PH Final    POC Protein, Urine 08/08/2024 300 (3+) (A)  NEGATIVE, 30 (1+) mg/dl Final    POC Urobilinogen, Urine 08/08/2024 1.0  0.2, 1.0 EU/DL Final    Poc Nitrite, Urine 08/08/2024 POSITIVE (A)  NEGATIVE Final    POC Leukocytes, Urine 08/08/2024 LARGE (3+) (A)  NEGATIVE Final         Assessment/Plan       We discussed both the rash and also the 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    I recommend checking for contact dermatitis with patch testing. The patient understands that the patch test cannot become wet so showers and heavy exercise need to be avoided. Also, please do not use any topical or oral steroids like prednisone as this can interfere with the testing results. But antihistamines for itching can be used. You should bring in any of your  "own creams, products, etc. that you would like us to also patch test.     Of note, since she had a reaction to triamcinolone, we will also evaluate patch testing for topical steroids.    2. 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 And you have a history of being on SCIT (Subcutaneous Immunotherapy or \"Allergy Shots\").     Based on the type of reaction that you experienced and the need for an epinephrine auto-injector, this patient qualifies for allergy testing and, if positive, immunotherapy to prevent further venom reactions. We briefly discussed that allergy shots are highly effective for preventing bee/venom allergic reactions.     I recommend using a new nasal epinephrine spray called Neffy.  You would take ONE spray in your nostril. Use your right hand to instill the spray straight up into the right nostril; or vice-versa with your left.  It is a ONE time use but you can repeat with the 2nd device if there is no response in 5 minutes.    INSERT the nozzle of the nasal spray device fully into one nostril until the fingers touch the nose; do not angle the nasal spray to the inside septum or outer wall of the nose, as some medication may be lost. PRESS the plunger firmly to activate. Do not sniff during or after administration.    When to use epinephrine:  If there is a severe allergic reaction such as throat tightness, wheezing, shortness of breath, vomiting, or other signs of anaphylaxis, then epinephrine should be used.      The expiration date for Neffy is longer than the traditional epinephrine pens and it is more tolerant of cold/warm temperatures.     If you use the medication, please seek medical attention (i.e. go to the Emergency room).  If neffy is not covered, will remain on epinephrine autoinjector.    Leon Deng MD       I also reviewed her medical chart including pictures sent to her PCP  Time was spent: 60 minutes - Preparing to see the " patient, obtaining and/or reviewing separately obtained history, performing a medically necessary appropriate physical examination and/or evaluation, counseling and educating the patient/family, ordering medications, tests or procedures, referring and communicating with other providers, documenting clinical information in the medical record, independently interpreting results and communicating results to the patient/family, and care coordination.      is being coded for visit complexity inherent to evaluation and management associated with medical care services that serve as the continuing focal point for medical care services that are part of ongoing care related to this patient’s single, serious condition or a complex condition.

## 2024-12-12 ENCOUNTER — OFFICE VISIT (OUTPATIENT)
Dept: PRIMARY CARE | Facility: CLINIC | Age: 74
End: 2024-12-12
Payer: MEDICARE

## 2024-12-12 VITALS
HEART RATE: 96 BPM | RESPIRATION RATE: 16 BRPM | WEIGHT: 212 LBS | OXYGEN SATURATION: 97 % | SYSTOLIC BLOOD PRESSURE: 132 MMHG | HEIGHT: 63 IN | BODY MASS INDEX: 37.56 KG/M2 | DIASTOLIC BLOOD PRESSURE: 78 MMHG

## 2024-12-12 DIAGNOSIS — L30.9 DERMATITIS: Primary | ICD-10-CM

## 2024-12-12 PROCEDURE — 3008F BODY MASS INDEX DOCD: CPT | Performed by: INTERNAL MEDICINE

## 2024-12-12 PROCEDURE — 99213 OFFICE O/P EST LOW 20 MIN: CPT | Performed by: INTERNAL MEDICINE

## 2024-12-12 PROCEDURE — 1123F ACP DISCUSS/DSCN MKR DOCD: CPT | Performed by: INTERNAL MEDICINE

## 2024-12-12 PROCEDURE — 1159F MED LIST DOCD IN RCRD: CPT | Performed by: INTERNAL MEDICINE

## 2024-12-12 PROCEDURE — 1126F AMNT PAIN NOTED NONE PRSNT: CPT | Performed by: INTERNAL MEDICINE

## 2024-12-12 PROCEDURE — G2211 COMPLEX E/M VISIT ADD ON: HCPCS | Performed by: INTERNAL MEDICINE

## 2024-12-12 RX ORDER — METHYLPREDNISOLONE 4 MG/1
TABLET ORAL
Qty: 21 TABLET | Refills: 0 | Status: SHIPPED | OUTPATIENT
Start: 2024-12-12 | End: 2024-12-18

## 2024-12-12 ASSESSMENT — ENCOUNTER SYMPTOMS
NERVOUS/ANXIOUS: 1
LOSS OF SENSATION IN FEET: 0
COUGH: 0
PALPITATIONS: 0
CONSTIPATION: 0
NAUSEA: 0
OCCASIONAL FEELINGS OF UNSTEADINESS: 0
ARTHRALGIAS: 0
DEPRESSION: 0
DIARRHEA: 0
DIZZINESS: 0
VOICE CHANGE: 1
SHORTNESS OF BREATH: 0
ABDOMINAL PAIN: 0

## 2024-12-12 ASSESSMENT — PAIN SCALES - GENERAL: PAINLEVEL_OUTOF10: 0-NO PAIN

## 2024-12-12 NOTE — PROGRESS NOTES
"Subjective   Patient ID: Odette Nicholson is a 74 y.o. female who presents for Rash.    Patient noticed that the prednisone pack and allegra has somewhat alleviated the itching of her body rash. Is still taking cold showers and using CeraVE/Gold Bond eczema topicals. Also, uses a jewelweed soap. Has been concerned about her thyroid due to her voice becoming more hoarse. Has been under some stress due to recent life stress factors.     Diagnostics Reviewed:  Labs Reviewed: 10/2/2024 LIU: was negative.          Review of Systems   HENT:  Positive for voice change.    Respiratory:  Negative for cough and shortness of breath.    Cardiovascular:  Negative for chest pain and palpitations.   Gastrointestinal:  Negative for abdominal pain, constipation, diarrhea and nausea.   Musculoskeletal:  Negative for arthralgias.   Skin:  Positive for rash.   Neurological:  Negative for dizziness.   Psychiatric/Behavioral:  Negative for behavioral problems. The patient is nervous/anxious.      Objective   /78   Pulse 96   Resp 16   Ht 1.6 m (5' 3\")   Wt 96.2 kg (212 lb)   SpO2 97%   BMI 37.55 kg/m²     Physical Exam  Cardiovascular:      Rate and Rhythm: Normal rate and regular rhythm.      Heart sounds: Normal heart sounds.   Pulmonary:      Breath sounds: Normal breath sounds.   Abdominal:      Palpations: Abdomen is soft. There is no hepatomegaly.      Tenderness: There is no abdominal tenderness.   Musculoskeletal:      Right lower leg: No edema.      Left lower leg: No edema.   Neurological:      Mental Status: She is alert and oriented to person, place, and time.      Gait: Gait normal.   Psychiatric:         Mood and Affect: Mood normal.         Behavior: Behavior normal.       Assessment/Plan   Diagnoses and all orders for this visit:  Dermatitis  -     methylPREDNISolone (Medrol Dospak) 4 mg tablets; Take as directed on package.      Scribe Attestation  By signing my name below, IZack Scribe   attest that " this documentation has been prepared under the direction and in the presence of Angela Tang MD.

## 2024-12-27 ENCOUNTER — OFFICE VISIT (OUTPATIENT)
Dept: URGENT CARE | Age: 74
End: 2024-12-27
Payer: MEDICARE

## 2024-12-27 VITALS
SYSTOLIC BLOOD PRESSURE: 134 MMHG | TEMPERATURE: 96.9 F | DIASTOLIC BLOOD PRESSURE: 74 MMHG | RESPIRATION RATE: 18 BRPM | HEART RATE: 76 BPM | OXYGEN SATURATION: 97 %

## 2024-12-27 DIAGNOSIS — H66.93 BILATERAL OTITIS MEDIA, UNSPECIFIED OTITIS MEDIA TYPE: Primary | ICD-10-CM

## 2024-12-27 RX ORDER — DOXYCYCLINE 100 MG/1
100 CAPSULE ORAL 2 TIMES DAILY
Qty: 14 CAPSULE | Refills: 0 | Status: SHIPPED | OUTPATIENT
Start: 2024-12-27 | End: 2025-01-03

## 2024-12-27 ASSESSMENT — ENCOUNTER SYMPTOMS: SINUS PRESSURE: 1

## 2024-12-27 NOTE — PATIENT INSTRUCTIONS
Otitis media:  -Doxycycline 2 times a day for 7 days.  -Please follow-up with your primary care doctor in the next 5 to 7 days.  -If worsening symptoms, please go to local emergency department for further evaluation.    Please follow up with your primary provider within one week if symptoms do not improve.  You may schedule an appointment online at [email or web address] or call [phone]. Go to the Emergency Department if symptoms significantly worsen or if you develop chest pain or shortness of breath.

## 2024-12-27 NOTE — PROGRESS NOTES
Subjective   Patient ID: Odette Nicholson is a 74 y.o. female. They present today with a chief complaint of Earache (Both ears - few days ).    History of Present Illness  Odette Nicholson is a 74 y.o. female who presents to the clinic for 48 hours of sinus pressure, pain, ear pain. Pt denies any chest pain, sob, N/V at this time in clinic.             Past Medical History  Allergies as of 12/27/2024 - Reviewed 12/27/2024   Allergen Reaction Noted    Latex Anaphylaxis 01/22/2010    Penicillins Anaphylaxis 01/22/2010    Shellfish derived Anaphylaxis 12/20/2023    Acetaminophen Unknown 12/20/2023    Antipyrine-benzocaine Swelling 12/20/2023    Bacitracin Unknown 12/20/2023    Carbamide peroxide Swelling 12/20/2023    Diphenhydramine hcl Unknown 06/30/2017    Ibuprofen Unknown 12/20/2023    Iodine Unknown 12/20/2023    Nsaids (non-steroidal anti-inflammatory drug) Headache 12/20/2023    Neomycin Rash 12/20/2023    Sulfa (sulfonamide antibiotics) Rash 01/22/2010       (Not in a hospital admission)       Past Medical History:   Diagnosis Date    Unspecified cataract     Cataracts, both eyes    Vitreous degeneration, right eye     PVD (posterior vitreous detachment), right eye       Past Surgical History:   Procedure Laterality Date    HAND SURGERY  06/04/2013    Hand Surgery                                                                                                                                                          HYSTERECTOMY  06/04/2013    Hysterectomy    TONSILLECTOMY  06/04/2013    Tonsillectomy        reports that she has never smoked. She has never been exposed to tobacco smoke. She has never used smokeless tobacco. She reports that she does not drink alcohol and does not use drugs.    Review of Systems  Review of Systems   HENT:  Positive for congestion, ear pain and sinus pressure.    All other systems reviewed and are negative.                                 Objective    Vitals:    12/27/24 1142   BP:  134/74   Pulse: 76   Resp: 18   Temp: 36.1 °C (96.9 °F)   SpO2: 97%     No LMP recorded. Patient is postmenopausal.    Physical Exam  Constitutional:       Appearance: Normal appearance.   HENT:      Right Ear: Tympanic membrane is erythematous.      Left Ear: Tympanic membrane is erythematous.      Nose:      Right Sinus: Frontal sinus tenderness present.      Mouth/Throat:      Pharynx: Posterior oropharyngeal erythema and postnasal drip present.   Cardiovascular:      Rate and Rhythm: Normal rate and regular rhythm.   Pulmonary:      Effort: Pulmonary effort is normal.      Breath sounds: Normal breath sounds.   Neurological:      General: No focal deficit present.      Mental Status: She is alert and oriented to person, place, and time. Mental status is at baseline.   Psychiatric:         Mood and Affect: Mood normal.         Behavior: Behavior normal.         Procedures    Point of Care Test & Imaging Results from this visit  No results found for this visit on 12/27/24.   No results found.    Diagnostic study results (if any) were reviewed by JADE Hines.    Assessment/Plan   Allergies, medications, history, and pertinent labs/EKGs/Imaging reviewed by JADE Hines.     Medical Decision Making  History and examination consistent with acute uncomplicated Otitis media. No evidence of TM perforation, otitis externa, mastoiditis, or sepsis. Counseled patient/family on treatment plan with oral antibiotics and supportive measures at home. Return to clinic or present to ED if symptoms change or worsen. Otherwise follow-up with PCP. Patient verbalized understanding and agrees with plan.       Orders and Diagnoses  There are no diagnoses linked to this encounter.    Medical Admin Record      Patient disposition: Home    Electronically signed by JADE Hines  12:22 PM

## 2025-01-15 ENCOUNTER — OFFICE VISIT (OUTPATIENT)
Dept: ALLERGY | Facility: CLINIC | Age: 75
End: 2025-01-15
Payer: MEDICARE

## 2025-01-15 VITALS
DIASTOLIC BLOOD PRESSURE: 80 MMHG | WEIGHT: 206.2 LBS | BODY MASS INDEX: 36.53 KG/M2 | SYSTOLIC BLOOD PRESSURE: 153 MMHG | HEART RATE: 93 BPM

## 2025-01-15 DIAGNOSIS — T78.2XXA ANAPHYLAXIS, INITIAL ENCOUNTER: ICD-10-CM

## 2025-01-15 DIAGNOSIS — T63.451A TOXIC EFFECT OF VENOM OF HORNETS, UNINTENTIONAL, INITIAL ENCOUNTER: Primary | ICD-10-CM

## 2025-01-15 PROCEDURE — 99215 OFFICE O/P EST HI 40 MIN: CPT | Performed by: ALLERGY & IMMUNOLOGY

## 2025-01-15 PROCEDURE — 95017 ALL TSTG PERQ&IQ W/VENOMS: CPT | Performed by: ALLERGY & IMMUNOLOGY

## 2025-01-15 PROCEDURE — 1123F ACP DISCUSS/DSCN MKR DOCD: CPT | Performed by: ALLERGY & IMMUNOLOGY

## 2025-01-15 RX ORDER — EPINEPHRINE 0.3 MG/.3ML
0.3 INJECTION INTRAMUSCULAR ONCE AS NEEDED
Qty: 2 EACH | Refills: 3 | Status: SHIPPED | OUTPATIENT
Start: 2025-01-15

## 2025-01-15 NOTE — PROGRESS NOTES
Subjective   Patient ID:   [id number]   Odette Nicholson is a 74 y.o. female who presents for Allergies and Allergy Testing.    Chief Complaint   Patient presents with    Allergies    Allergy Testing          HPI  This patient is here to evaluate for:    Venom sting - ED visit, given IM benadryl, in 2001 approximately.   Throat closing, trouble breathing.   Now she carries an epipen.    Had shots 40 years ago but thinks it was environmental    Then saw Dr. Paz - but didn't start shots due to being during covid.    Tried the yellow jacket shots but her allergy physician retired and she stopped the treatment.  Interested in desensitization.     She is avoiding outdoor situations like hiking that she wants to enjoy due to concern about venom allergic reactions.     Had last sting was 25 years ago.     previously reported:     Started with rash on 9/6/24 1 week after her covid booster  A week later, dermatitis on her back.  It then spread to the remainder of the body.     Saw derm 9/11/24, Dr. Henry See, who Dr. Tang recommended.  This patient has a history of seborrheic keratoses as well as history of significant sun exposure.     Rash started on her back and has spread to scalp, ears, abdomen, and hips. Has not spread lower than that.      While she has had the seborrheic keratoses for a long time, she reports that the rash on the back is NEW.     Various treatments have been prescribed and this patient reports that nothing is working.   She was given, by her primary care doctor, a Medrol pack given and she plans on starting it today.     There is no known cause for the rash.  However, she believes that she has the answer and that the COVID vaccination is causative. 10 days ago, saw dentist for a routine checkup, and mentioned the dermatitis; the dentist relayed that she has seen or heard of dermatitis starting after covid vaccinations, despite the brands.  Her  did research and found that this is a common  mRNA vaccine adverse reaction.     Dermatology - saw Derm a couple weeks ago.   Triamcinolone - had worse symptoms, and had an allergic reaction to it.   Desoximetasone - working better.      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  [place] see Dermatology for skin checks q6mo.       Review of Systems   All other systems reviewed and are negative.        Objective     /80   Pulse 93   Wt 93.5 kg (206 lb 3.2 oz)   BMI 36.53 kg/m²      Physical Exam  Constitutional:       Appearance: Normal appearance.   HENT:      Head: Normocephalic and atraumatic.   Eyes:      Extraocular Movements: Extraocular movements intact.      Conjunctiva/sclera: Conjunctivae normal.      Pupils: Pupils are equal, round, and reactive to light.   Pulmonary:      Effort: Pulmonary effort is normal.   Musculoskeletal:      Cervical back: Normal range of motion.   Neurological:      Mental Status: She is alert and oriented to person, place, and time. Mental status is at baseline.   Psychiatric:         Mood and Affect: Mood normal.         Behavior: Behavior normal.         Thought Content: Thought content normal.         Judgment: Judgment normal.            Current Outpatient Medications   Medication Sig Dispense Refill    [place] Thyroid 60 mg tablet TAKE 1 TABLET DAILY ON AN EMPTY STOMACH 90 tablet 3    camphor-menthoL (Sarna OriginaL) lotion Apply topically if needed for itching. 222 mL 0    cholecalciferol (Vitamin D-3) 50,000 unit capsule TAKE 1 CAPSULE BY MOUTH EVERY WEEK 12 capsule 3    desoximetasone (Topicort) 0.05 % cream Apply 1 application. topically 2 times a day.      EPINEPHrine (EpiPen 2-Eliecer) 0.3 mg/0.3 mL injection syringe Inject 0.3 mL (0.3 mg) into the muscle. As Directed      EPINEPHrine (neffy) 2 mg/spray (0.1 mL) spray,non-aerosol Administer 1 spray into affected nostril(s) 1 time if  needed (anaphylaxis) for up to 1 dose. 2 each 5    eucerin (CeraVe) cream Apply 1 Application topically once daily as needed for dry skin.      fexofenadine (Allegra Allergy) 60 mg tablet Take 1 tablet (60 mg) by mouth once daily.      hydrocortisone 1 % cream Apply 1 Application topically 2 times a day.      hydrOXYzine HCL (Atarax) 25 mg tablet Take 1 tablet (25 mg) by mouth every 8 hours if needed for itching. 90 tablet 11    triamcinolone (Kenalog) 0.5 % cream Apply topically every 12 hours. 15 g 3    Vagifem 10 mcg tablet vaginal tablet Insert 1 tablet (10 mcg) into the vagina 3 (three) times a week. 36 tablet 3     No current facility-administered medications for this visit.       Summary of the labs over the past 6 months:    Lab on 10/02/2024   Component Date Value Ref Range Status    WBC 10/02/2024 5.3  4.4 - 11.3 x10*3/uL Final    nRBC 10/02/2024 0.0  0.0 - 0.0 /100 WBCs Final    RBC 10/02/2024 5.32 (H)  4.00 - 5.20 x10*6/uL Final    Hemoglobin 10/02/2024 13.3  12.0 - 16.0 g/dL Final    Hematocrit 10/02/2024 42.8  36.0 - 46.0 % Final    MCV 10/02/2024 81  80 - 100 fL Final    MCH 10/02/2024 25.0 (L)  26.0 - 34.0 pg Final    MCHC 10/02/2024 31.1 (L)  32.0 - 36.0 g/dL Final    RDW 10/02/2024 17.2 (H)  11.5 - 14.5 % Final    Platelets 10/02/2024 247  150 - 450 x10*3/uL Final    Neutrophils % 10/02/2024 58.0  40.0 - 80.0 % Final    Immature Granulocytes %, Automated 10/02/2024 0.4  0.0 - 0.9 % Final    Lymphocytes % 10/02/2024 28.8  13.0 - 44.0 % Final    Monocytes % 10/02/2024 9.9  2.0 - 10.0 % Final    Eosinophils % 10/02/2024 2.5  0.0 - 6.0 % Final    Basophils % 10/02/2024 0.4  0.0 - 2.0 % Final    Neutrophils Absolute 10/02/2024 3.05  1.60 - 5.50 x10*3/uL Final    Immature Granulocytes Absolute, Au* 10/02/2024 0.02  0.00 - 0.50 x10*3/uL Final    Lymphocytes Absolute 10/02/2024 1.51  0.80 - 3.00 x10*3/uL Final    Monocytes Absolute 10/02/2024 0.52  0.05 - 0.80 x10*3/uL Final    Eosinophils Absolute  10/02/2024 0.13  0.00 - 0.40 x10*3/uL Final    Basophils Absolute 10/02/2024 0.02  0.00 - 0.10 x10*3/uL Final    Glucose 10/02/2024 97  74 - 99 mg/dL Final    Sodium 10/02/2024 142  136 - 145 mmol/L Final    Potassium 10/02/2024 4.4  3.5 - 5.3 mmol/L Final    Chloride 10/02/2024 104  98 - 107 mmol/L Final    Bicarbonate 10/02/2024 31  21 - 32 mmol/L Final    Anion Gap 10/02/2024 11  10 - 20 mmol/L Final    Urea Nitrogen 10/02/2024 14  6 - 23 mg/dL Final    Creatinine 10/02/2024 0.85  0.50 - 1.05 mg/dL Final    eGFR 10/02/2024 72  >60 mL/min/1.73m*2 Final    Calcium 10/02/2024 8.9  8.6 - 10.3 mg/dL Final    Albumin 10/02/2024 4.4  3.4 - 5.0 g/dL Final    Alkaline Phosphatase 10/02/2024 103  33 - 136 U/L Final    Total Protein 10/02/2024 6.6  6.4 - 8.2 g/dL Final    AST 10/02/2024 15  9 - 39 U/L Final    Bilirubin, Total 10/02/2024 0.5  0.0 - 1.2 mg/dL Final    ALT 10/02/2024 16  7 - 45 U/L Final    Vitamin B12 10/02/2024 321  211 - 911 pg/mL Final    Hemoglobin A1C 10/02/2024 5.4  See comment % Final    Estimated Average Glucose 10/02/2024 108  Not Established mg/dL Final    Thyroid Stimulating Hormone 10/02/2024 1.95  0.44 - 3.98 mIU/L Final    Sedimentation Rate 10/02/2024 10  0 - 30 mm/h Final    C-Reactive Protein 10/02/2024 0.35  <1.00 mg/dL Final    Vitamin B1, Whole Blood 10/02/2024 103  70 - 180 nmol/L Final    LIU 10/02/2024 Negative  Negative Final   Office Visit on 08/08/2024   Component Date Value Ref Range Status    POC Color, Urine 08/08/2024 Orange (A)  Straw, Yellow, Light-Yellow Final    POC Appearance, Urine 08/08/2024 Cloudy (A)  Clear Final    POC Glucose, Urine 08/08/2024 100 (1+) (A)  NEGATIVE mg/dl Final    POC Bilirubin, Urine 08/08/2024 SMALL (1+) (A)  NEGATIVE Final    POC Ketones, Urine 08/08/2024 TRACE (A)  NEGATIVE mg/dl Final    POC Specific Gravity, Urine 08/08/2024 1.020  1.005 - 1.035 Final    POC Blood, Urine 08/08/2024 LARGE (3+) (A)  NEGATIVE Final    POC PH, Urine 08/08/2024 6.0   No Reference Range Established PH Final    POC Protein, Urine 08/08/2024 300 (3+) (A)  NEGATIVE, 30 (1+) mg/dl Final    POC Urobilinogen, Urine 08/08/2024 1.0  0.2, 1.0 EU/DL Final    Poc Nitrite, Urine 08/08/2024 POSITIVE (A)  NEGATIVE Final    POC Leukocytes, Urine 08/08/2024 LARGE (3+) (A)  NEGATIVE Final         Assessment/Plan   Diagnoses and all orders for this visit:  Toxic effect of venom of hornets, unintentional, initial encounter  -     EPINEPHrine (EpiPen 2-Eliecer) 0.3 mg/0.3 mL injection syringe; Inject 0.3 mL (0.3 mg) into the muscle 1 time if needed for anaphylaxis for up to 1 dose. As Directed  Anaphylaxis, initial encounter  -     EPINEPHrine (EpiPen 2-Eliecer) 0.3 mg/0.3 mL injection syringe; Inject 0.3 mL (0.3 mg) into the muscle 1 time if needed for anaphylaxis for up to 1 dose. As Directed    I recommended that the patient undergo allergy immunotherapy for venoms.  Patients with a history of systemic reactions to venom have a high risk of future reactions including anaphylaxis. Your allergy can be life threatening and triggers need to be avoided.  Allergy shot therapy is 95% effective in preventing a severe reaction.  After the initial buildup phase requiring weekly shots, monthly injections for 3-5 years are protective and would be life-saving. The benefits and risks of allergy shots were discussed with the patient including the risk of an allergic reaction to the shot that would require treatment. For this reason, a 30-minute wait period in the office is advised.  We also discussed allergy avoidance measures for bees such as not wearing brightly colored clothing, perfumes, or strong scents and being cautious when eating outdoors.  In the event of a future sting reaction, for symptoms limited to the skin, Benadryl may be used. In case of a more severe allergic reaction or anaphylaxis, I recommended that an EpiPen be kept on-person in case of a severe allergic reaction.  We discussed how and when to  use this medication.  Also, the epinephrine will need to be renewed in one year and should not be placed in a hot or cold temperature location, such as a car, as this will degrade the epinephrine.     She will follow-up for first shot visit.      Leon Deng MD     Time was spent: 40 minutes - Preparing to see the patient, obtaining and/or reviewing separately obtained history, performing a medically necessary appropriate physical examination and/or evaluation, counseling and educating the patient/family, ordering medications, tests or procedures, referring and communicating with other providers, documenting clinical information in the medical record, independently interpreting results and communicating results to the patient/family, and care coordination.

## 2025-01-22 ENCOUNTER — APPOINTMENT (OUTPATIENT)
Dept: ALLERGY | Facility: CLINIC | Age: 75
End: 2025-01-22
Payer: MEDICARE

## 2025-01-24 ENCOUNTER — APPOINTMENT (OUTPATIENT)
Dept: ALLERGY | Facility: CLINIC | Age: 75
End: 2025-01-24
Payer: MEDICARE

## 2025-02-24 ENCOUNTER — APPOINTMENT (OUTPATIENT)
Dept: ALLERGY | Facility: CLINIC | Age: 75
End: 2025-02-24
Payer: MEDICARE

## 2025-02-24 VITALS
SYSTOLIC BLOOD PRESSURE: 156 MMHG | DIASTOLIC BLOOD PRESSURE: 72 MMHG | HEART RATE: 68 BPM | BODY MASS INDEX: 37.2 KG/M2 | WEIGHT: 210 LBS

## 2025-02-24 DIAGNOSIS — T63.451A TOXIC EFFECT OF VENOM OF HORNETS, UNINTENTIONAL, INITIAL ENCOUNTER: ICD-10-CM

## 2025-02-24 DIAGNOSIS — L23.89 ALLERGIC CONTACT DERMATITIS DUE TO OTHER AGENTS: Primary | ICD-10-CM

## 2025-02-24 PROCEDURE — 95044 PATCH/APPLICATION TESTS: CPT | Performed by: ALLERGY & IMMUNOLOGY

## 2025-02-24 PROCEDURE — 1123F ACP DISCUSS/DSCN MKR DOCD: CPT | Performed by: ALLERGY & IMMUNOLOGY

## 2025-02-24 PROCEDURE — 99214 OFFICE O/P EST MOD 30 MIN: CPT | Performed by: ALLERGY & IMMUNOLOGY

## 2025-02-24 NOTE — PROGRESS NOTES
Subjective   Patient ID:   [id number]   Odette Nicholson is a 74 y.o. female who presents for Allergies (Pt presents today for patch apply).    Chief Complaint   Patient presents with    Allergies     Pt presents today for patch apply          HPI  This patient is here to evaluate for:  Currently using cereve, lotion and cream hc over the counter ,   Sarna  [place] gel.    Has done 2 steroid packs and she reports this helped her.     Reports having seb keratoses but these are lighter brown in oanh tree pattern on back, and also abdomen and between breasts.    previously reported:     Started with rash on 9/6/24 1 week after her covid booster  A week later, dermatitis on her back.  It then spread to the remainder of the body.     Saw derm 9/11/24, Dr. Henry See, who Dr. Tang recommended.  This patient has a history of seborrheic keratoses as well as history of significant sun exposure.     Rash started on her back and has spread to scalp, ears, abdomen, and hips. Has not spread lower than that.      While she has had the seborrheic keratoses for a long time, she reports that the rash on the back is NEW.     Various treatments have been prescribed and this patient reports that nothing is working.   She was given, by her primary care doctor, a Medrol pack given and she plans on starting it today.     There is no known cause for the rash.  However, she believes that she has the answer and that the COVID vaccination is causative. 10 days ago, saw dentist for a routine checkup, and mentioned the dermatitis; the dentist relayed that she has seen or heard of dermatitis starting after covid vaccinations, despite the brands.  Her  did research and found that this is a common mRNA vaccine adverse reaction.     Dermatology - saw Derm a couple weeks ago.   Triamcinolone - had worse symptoms, and had an allergic reaction to it.   Desoximetasone - working better.      Sent to Allergy for evaluation for Allergic Contact  Dermatitis      Review of Systems   All other systems reviewed and are negative.        Objective     /72 (BP Location: Right arm, BP Cuff Size: Adult)   Pulse 68   Wt 95.3 kg (210 lb)   BMI 37.20 kg/m²      Physical Exam  Constitutional:       Appearance: Normal appearance.   HENT:      Head: Normocephalic and atraumatic.   Eyes:      Extraocular Movements: Extraocular movements intact.      Conjunctiva/sclera: Conjunctivae normal.      Pupils: Pupils are equal, round, and reactive to light.   Pulmonary:      Effort: Pulmonary effort is normal.   Musculoskeletal:      Cervical back: Normal range of motion.   Neurological:      Mental Status: She is alert and oriented to person, place, and time. Mental status is at baseline.   Psychiatric:         Mood and Affect: Mood normal.         Behavior: Behavior normal.         Thought Content: Thought content normal.         Judgment: Judgment normal.            Current Outpatient Medications   Medication Sig Dispense Refill    [place] Thyroid 60 mg tablet TAKE 1 TABLET DAILY ON AN EMPTY STOMACH 90 tablet 3    camphor-menthoL (Sarna OriginaL) lotion Apply topically if needed for itching. 222 mL 0    cholecalciferol (Vitamin D-3) 50,000 unit capsule TAKE 1 CAPSULE BY MOUTH EVERY WEEK 12 capsule 3    desoximetasone (Topicort) 0.05 % cream Apply 1 application. topically 2 times a day.      EPINEPHrine (EpiPen 2-Eliecer) 0.3 mg/0.3 mL injection syringe Inject 0.3 mL (0.3 mg) into the muscle 1 time if needed for anaphylaxis for up to 1 dose. As Directed 2 each 3    eucerin (CeraVe) cream Apply 1 Application topically once daily as needed for dry skin.      fexofenadine (Allegra Allergy) 60 mg tablet Take 1 tablet (60 mg) by mouth once daily.      hydrocortisone 1 % cream Apply 1 Application topically 2 times a day.      hydrOXYzine HCL (Atarax) 25 mg tablet Take 1 tablet (25 mg) by mouth every 8 hours if needed for itching. 90 tablet 11    triamcinolone (Kenalog) 0.5 % cream  Apply topically every 12 hours. 15 g 3    Vagifem 10 mcg tablet vaginal tablet Insert 1 tablet (10 mcg) into the vagina 3 (three) times a week. 36 tablet 3     No current facility-administered medications for this visit.       Summary of the labs over the past 6 months:    Lab on 10/02/2024   Component Date Value Ref Range Status    WBC 10/02/2024 5.3  4.4 - 11.3 x10*3/uL Final    nRBC 10/02/2024 0.0  0.0 - 0.0 /100 WBCs Final    RBC 10/02/2024 5.32 (H)  4.00 - 5.20 x10*6/uL Final    Hemoglobin 10/02/2024 13.3  12.0 - 16.0 g/dL Final    Hematocrit 10/02/2024 42.8  36.0 - 46.0 % Final    MCV 10/02/2024 81  80 - 100 fL Final    MCH 10/02/2024 25.0 (L)  26.0 - 34.0 pg Final    MCHC 10/02/2024 31.1 (L)  32.0 - 36.0 g/dL Final    RDW 10/02/2024 17.2 (H)  11.5 - 14.5 % Final    Platelets 10/02/2024 247  150 - 450 x10*3/uL Final    Neutrophils % 10/02/2024 58.0  40.0 - 80.0 % Final    Immature Granulocytes %, Automated 10/02/2024 0.4  0.0 - 0.9 % Final    Lymphocytes % 10/02/2024 28.8  13.0 - 44.0 % Final    Monocytes % 10/02/2024 9.9  2.0 - 10.0 % Final    Eosinophils % 10/02/2024 2.5  0.0 - 6.0 % Final    Basophils % 10/02/2024 0.4  0.0 - 2.0 % Final    Neutrophils Absolute 10/02/2024 3.05  1.60 - 5.50 x10*3/uL Final    Immature Granulocytes Absolute, Au* 10/02/2024 0.02  0.00 - 0.50 x10*3/uL Final    Lymphocytes Absolute 10/02/2024 1.51  0.80 - 3.00 x10*3/uL Final    Monocytes Absolute 10/02/2024 0.52  0.05 - 0.80 x10*3/uL Final    Eosinophils Absolute 10/02/2024 0.13  0.00 - 0.40 x10*3/uL Final    Basophils Absolute 10/02/2024 0.02  0.00 - 0.10 x10*3/uL Final    Glucose 10/02/2024 97  74 - 99 mg/dL Final    Sodium 10/02/2024 142  136 - 145 mmol/L Final    Potassium 10/02/2024 4.4  3.5 - 5.3 mmol/L Final    Chloride 10/02/2024 104  98 - 107 mmol/L Final    Bicarbonate 10/02/2024 31  21 - 32 mmol/L Final    Anion Gap 10/02/2024 11  10 - 20 mmol/L Final    Urea Nitrogen 10/02/2024 14  6 - 23 mg/dL Final    Creatinine  10/02/2024 0.85  0.50 - 1.05 mg/dL Final    eGFR 10/02/2024 72  >60 mL/min/1.73m*2 Final    Calcium 10/02/2024 8.9  8.6 - 10.3 mg/dL Final    Albumin 10/02/2024 4.4  3.4 - 5.0 g/dL Final    Alkaline Phosphatase 10/02/2024 103  33 - 136 U/L Final    Total Protein 10/02/2024 6.6  6.4 - 8.2 g/dL Final    AST 10/02/2024 15  9 - 39 U/L Final    Bilirubin, Total 10/02/2024 0.5  0.0 - 1.2 mg/dL Final    ALT 10/02/2024 16  7 - 45 U/L Final    Vitamin B12 10/02/2024 321  211 - 911 pg/mL Final    Hemoglobin A1C 10/02/2024 5.4  See comment % Final    Estimated Average Glucose 10/02/2024 108  Not Established mg/dL Final    Thyroid Stimulating Hormone 10/02/2024 1.95  0.44 - 3.98 mIU/L Final    Sedimentation Rate 10/02/2024 10  0 - 30 mm/h Final    C-Reactive Protein 10/02/2024 0.35  <1.00 mg/dL Final    Vitamin B1, Whole Blood 10/02/2024 103  70 - 180 nmol/L Final    LIU 10/02/2024 Negative  Negative Final         Assessment/Plan   Diagnoses and all orders for this visit:  Allergic contact dermatitis due to other agents  Toxic effect of venom of hornets, unintentional, initial encounter      The patch tests for contact dermatitis allergy were applied. The patient understands that the patch test cannot become wet so showers and heavy exercise need to be avoided. They will return in 2 days for reading of the test and then 4 days for the second reading. If pruritus is experienced, an antihistamine such as Benadryl can be used. This patient demonstrates understanding and will followup in 2 days.     Venom allergy - see previous notes; recommend starting allergy shots        Leon Deng MD

## 2025-02-26 ENCOUNTER — APPOINTMENT (OUTPATIENT)
Dept: ALLERGY | Facility: CLINIC | Age: 75
End: 2025-02-26
Payer: MEDICARE

## 2025-02-26 DIAGNOSIS — L23.89 ALLERGIC CONTACT DERMATITIS DUE TO OTHER AGENTS: Primary | ICD-10-CM

## 2025-02-26 DIAGNOSIS — T78.2XXA ANAPHYLAXIS, INITIAL ENCOUNTER: ICD-10-CM

## 2025-02-26 DIAGNOSIS — T63.451A TOXIC EFFECT OF VENOM OF HORNETS, UNINTENTIONAL, INITIAL ENCOUNTER: ICD-10-CM

## 2025-02-26 PROCEDURE — 1123F ACP DISCUSS/DSCN MKR DOCD: CPT | Performed by: ALLERGY & IMMUNOLOGY

## 2025-02-26 PROCEDURE — G2211 COMPLEX E/M VISIT ADD ON: HCPCS | Performed by: ALLERGY & IMMUNOLOGY

## 2025-02-26 PROCEDURE — 99214 OFFICE O/P EST MOD 30 MIN: CPT | Performed by: ALLERGY & IMMUNOLOGY

## 2025-02-26 NOTE — PROGRESS NOTES
Subjective   Patient ID:   [id number]   Odette Nicholson is a 74 y.o. female who presents for Allergy Testing (Here for first patch test read.).    Chief Complaint   Patient presents with    Allergy Testing     Here for first patch test read.          HPI  This patient is here to evaluate for:  Allergic Contact Dermatitis    This patient tolerated the patch testing and did not require using any antihistamines for pruritus. The patches remained in place and were kept dry.     Also reports sig allergy to cats and is interested in testing.    Venom allergy - see previous notes      Review of Systems   All other systems reviewed and are negative.        Objective     There were no vitals taken for this visit.     Physical Exam  Constitutional:       Appearance: Normal appearance.   HENT:      Head: Normocephalic and atraumatic.   Eyes:      Extraocular Movements: Extraocular movements intact.      Conjunctiva/sclera: Conjunctivae normal.      Pupils: Pupils are equal, round, and reactive to light.   Pulmonary:      Effort: Pulmonary effort is normal.   Musculoskeletal:      Cervical back: Normal range of motion.   Neurological:      Mental Status: She is alert and oriented to person, place, and time. Mental status is at baseline.   Psychiatric:         Mood and Affect: Mood normal.         Behavior: Behavior normal.         Thought Content: Thought content normal.         Judgment: Judgment normal.            Current Outpatient Medications   Medication Sig Dispense Refill    [place] Thyroid 60 mg tablet TAKE 1 TABLET DAILY ON AN EMPTY STOMACH 90 tablet 3    camphor-menthoL (Sarna OriginaL) lotion Apply topically if needed for itching. 222 mL 0    cholecalciferol (Vitamin D-3) 50,000 unit capsule TAKE 1 CAPSULE BY MOUTH EVERY WEEK 12 capsule 3    desoximetasone (Topicort) 0.05 % cream Apply 1 application. topically 2 times a day.      EPINEPHrine (EpiPen 2-Eliecer) 0.3 mg/0.3 mL injection syringe Inject 0.3 mL (0.3 mg) into the  muscle 1 time if needed for anaphylaxis for up to 1 dose. As Directed 2 each 3    eucerin (CeraVe) cream Apply 1 Application topically once daily as needed for dry skin.      fexofenadine (Allegra Allergy) 60 mg tablet Take 1 tablet (60 mg) by mouth once daily.      hydrocortisone 1 % cream Apply 1 Application topically 2 times a day.      hydrOXYzine HCL (Atarax) 25 mg tablet Take 1 tablet (25 mg) by mouth every 8 hours if needed for itching. 90 tablet 11    triamcinolone (Kenalog) 0.5 % cream Apply topically every 12 hours. 15 g 3    Vagifem 10 mcg tablet vaginal tablet Insert 1 tablet (10 mcg) into the vagina 3 (three) times a week. 36 tablet 3     No current facility-administered medications for this visit.       Summary of the labs over the past 6 months:    Lab on 10/02/2024   Component Date Value Ref Range Status    WBC 10/02/2024 5.3  4.4 - 11.3 x10*3/uL Final    nRBC 10/02/2024 0.0  0.0 - 0.0 /100 WBCs Final    RBC 10/02/2024 5.32 (H)  4.00 - 5.20 x10*6/uL Final    Hemoglobin 10/02/2024 13.3  12.0 - 16.0 g/dL Final    Hematocrit 10/02/2024 42.8  36.0 - 46.0 % Final    MCV 10/02/2024 81  80 - 100 fL Final    MCH 10/02/2024 25.0 (L)  26.0 - 34.0 pg Final    MCHC 10/02/2024 31.1 (L)  32.0 - 36.0 g/dL Final    RDW 10/02/2024 17.2 (H)  11.5 - 14.5 % Final    Platelets 10/02/2024 247  150 - 450 x10*3/uL Final    Neutrophils % 10/02/2024 58.0  40.0 - 80.0 % Final    Immature Granulocytes %, Automated 10/02/2024 0.4  0.0 - 0.9 % Final    Lymphocytes % 10/02/2024 28.8  13.0 - 44.0 % Final    Monocytes % 10/02/2024 9.9  2.0 - 10.0 % Final    Eosinophils % 10/02/2024 2.5  0.0 - 6.0 % Final    Basophils % 10/02/2024 0.4  0.0 - 2.0 % Final    Neutrophils Absolute 10/02/2024 3.05  1.60 - 5.50 x10*3/uL Final    Immature Granulocytes Absolute, Au* 10/02/2024 0.02  0.00 - 0.50 x10*3/uL Final    Lymphocytes Absolute 10/02/2024 1.51  0.80 - 3.00 x10*3/uL Final    Monocytes Absolute 10/02/2024 0.52  0.05 - 0.80 x10*3/uL Final     Eosinophils Absolute 10/02/2024 0.13  0.00 - 0.40 x10*3/uL Final    Basophils Absolute 10/02/2024 0.02  0.00 - 0.10 x10*3/uL Final    Glucose 10/02/2024 97  74 - 99 mg/dL Final    Sodium 10/02/2024 142  136 - 145 mmol/L Final    Potassium 10/02/2024 4.4  3.5 - 5.3 mmol/L Final    Chloride 10/02/2024 104  98 - 107 mmol/L Final    Bicarbonate 10/02/2024 31  21 - 32 mmol/L Final    Anion Gap 10/02/2024 11  10 - 20 mmol/L Final    Urea Nitrogen 10/02/2024 14  6 - 23 mg/dL Final    Creatinine 10/02/2024 0.85  0.50 - 1.05 mg/dL Final    eGFR 10/02/2024 72  >60 mL/min/1.73m*2 Final    Calcium 10/02/2024 8.9  8.6 - 10.3 mg/dL Final    Albumin 10/02/2024 4.4  3.4 - 5.0 g/dL Final    Alkaline Phosphatase 10/02/2024 103  33 - 136 U/L Final    Total Protein 10/02/2024 6.6  6.4 - 8.2 g/dL Final    AST 10/02/2024 15  9 - 39 U/L Final    Bilirubin, Total 10/02/2024 0.5  0.0 - 1.2 mg/dL Final    ALT 10/02/2024 16  7 - 45 U/L Final    Vitamin B12 10/02/2024 321  211 - 911 pg/mL Final    Hemoglobin A1C 10/02/2024 5.4  See comment % Final    Estimated Average Glucose 10/02/2024 108  Not Established mg/dL Final    Thyroid Stimulating Hormone 10/02/2024 1.95  0.44 - 3.98 mIU/L Final    Sedimentation Rate 10/02/2024 10  0 - 30 mm/h Final    C-Reactive Protein 10/02/2024 0.35  <1.00 mg/dL Final    Vitamin B1, Whole Blood 10/02/2024 103  70 - 180 nmol/L Final    LIU 10/02/2024 Negative  Negative Final         Assessment/Plan   Diagnoses and all orders for this visit:  Allergic contact dermatitis due to other agents  Toxic effect of venom of hornets, unintentional, initial encounter  Anaphylaxis, initial encounter    We will check the final reading of the patch testing in 2 days. I advised this patient to avoid getting the back wet, so that the pen markings stay in place.   We began to discuss the positive tests and will provide allergy avoidance measures and further guidance.     Will schedule the venom shots so we can begin treatment      Also will schedule additional allergy testing for environmental allergens:  This patient will return to undergo allergy testing with scratch skin tests. Be sure to be off antihistamines for 3 days. We will check the 1st 5 environmental screening panels.      Leon Deng MD      is being coded for visit complexity inherent to evaluation and management associated with medical care services that serve as the continuing focal point for medical care services that are part of ongoing care related to this patient’s single, serious condition or a complex condition.

## 2025-02-28 ENCOUNTER — APPOINTMENT (OUTPATIENT)
Dept: ALLERGY | Facility: CLINIC | Age: 75
End: 2025-02-28
Payer: MEDICARE

## 2025-02-28 VITALS — DIASTOLIC BLOOD PRESSURE: 75 MMHG | WEIGHT: 212 LBS | BODY MASS INDEX: 37.55 KG/M2 | SYSTOLIC BLOOD PRESSURE: 158 MMHG

## 2025-02-28 DIAGNOSIS — J31.0 CHRONIC RHINITIS: ICD-10-CM

## 2025-02-28 DIAGNOSIS — T63.451A TOXIC EFFECT OF VENOM OF HORNETS, UNINTENTIONAL, INITIAL ENCOUNTER: ICD-10-CM

## 2025-02-28 DIAGNOSIS — L23.89 ALLERGIC CONTACT DERMATITIS DUE TO OTHER AGENTS: Primary | ICD-10-CM

## 2025-02-28 PROCEDURE — 1123F ACP DISCUSS/DSCN MKR DOCD: CPT | Performed by: ALLERGY & IMMUNOLOGY

## 2025-02-28 PROCEDURE — 99214 OFFICE O/P EST MOD 30 MIN: CPT | Performed by: ALLERGY & IMMUNOLOGY

## 2025-02-28 PROCEDURE — G2211 COMPLEX E/M VISIT ADD ON: HCPCS | Performed by: ALLERGY & IMMUNOLOGY

## 2025-02-28 NOTE — PROGRESS NOTES
Subjective   Patient ID:   [id number]   Odette Nicholson is a 74 y.o. female who presents for No chief complaint on file..    Chief complaint: Allergic Contact Dermatitis , allergic rhinitis and conjunctivitis        HPI  This patient is here to evaluate for:  Allergic Contact Dermatitis    This patient tolerated the patch testing and did not require using any antihistamines for pruritus. The patches remained in place and were kept dry.     Also reports sig allergy to cats and is interested in testing.    Venom allergy - see previous notes      Review of Systems   All other systems reviewed and are negative.        Objective     /75 (BP Location: Right arm, Patient Position: Sitting, BP Cuff Size: Adult)   Wt 96.2 kg (212 lb)   BMI 37.55 kg/m²      Physical Exam  Constitutional:       Appearance: Normal appearance.   HENT:      Head: Normocephalic and atraumatic.   Eyes:      Extraocular Movements: Extraocular movements intact.      Conjunctiva/sclera: Conjunctivae normal.      Pupils: Pupils are equal, round, and reactive to light.   Pulmonary:      Effort: Pulmonary effort is normal.   Musculoskeletal:      Cervical back: Normal range of motion.   Neurological:      Mental Status: She is alert and oriented to person, place, and time. Mental status is at baseline.   Psychiatric:         Mood and Affect: Mood normal.         Behavior: Behavior normal.         Thought Content: Thought content normal.         Judgment: Judgment normal.            Current Outpatient Medications   Medication Sig Dispense Refill    [place] Thyroid 60 mg tablet TAKE 1 TABLET DAILY ON AN EMPTY STOMACH 90 tablet 3    camphor-menthoL (Sarna OriginaL) lotion Apply topically if needed for itching. 222 mL 0    cholecalciferol (Vitamin D-3) 50,000 unit capsule TAKE 1 CAPSULE BY MOUTH EVERY WEEK 12 capsule 3    desoximetasone (Topicort) 0.05 % cream Apply 1 application. topically 2 times a day.      EPINEPHrine (EpiPen 2-Eliecer) 0.3 mg/0.3 mL  injection syringe Inject 0.3 mL (0.3 mg) into the muscle 1 time if needed for anaphylaxis for up to 1 dose. As Directed 2 each 3    eucerin (CeraVe) cream Apply 1 Application topically once daily as needed for dry skin.      fexofenadine (Allegra Allergy) 60 mg tablet Take 1 tablet (60 mg) by mouth once daily.      hydrocortisone 1 % cream Apply 1 Application topically 2 times a day.      hydrOXYzine HCL (Atarax) 25 mg tablet Take 1 tablet (25 mg) by mouth every 8 hours if needed for itching. 90 tablet 11    triamcinolone (Kenalog) 0.5 % cream Apply topically every 12 hours. 15 g 3    Vagifem 10 mcg tablet vaginal tablet Insert 1 tablet (10 mcg) into the vagina 3 (three) times a week. 36 tablet 3     No current facility-administered medications for this visit.       Summary of the labs over the past 6 months:    Lab on 10/02/2024   Component Date Value Ref Range Status    WBC 10/02/2024 5.3  4.4 - 11.3 x10*3/uL Final    nRBC 10/02/2024 0.0  0.0 - 0.0 /100 WBCs Final    RBC 10/02/2024 5.32 (H)  4.00 - 5.20 x10*6/uL Final    Hemoglobin 10/02/2024 13.3  12.0 - 16.0 g/dL Final    Hematocrit 10/02/2024 42.8  36.0 - 46.0 % Final    MCV 10/02/2024 81  80 - 100 fL Final    MCH 10/02/2024 25.0 (L)  26.0 - 34.0 pg Final    MCHC 10/02/2024 31.1 (L)  32.0 - 36.0 g/dL Final    RDW 10/02/2024 17.2 (H)  11.5 - 14.5 % Final    Platelets 10/02/2024 247  150 - 450 x10*3/uL Final    Neutrophils % 10/02/2024 58.0  40.0 - 80.0 % Final    Immature Granulocytes %, Automated 10/02/2024 0.4  0.0 - 0.9 % Final    Lymphocytes % 10/02/2024 28.8  13.0 - 44.0 % Final    Monocytes % 10/02/2024 9.9  2.0 - 10.0 % Final    Eosinophils % 10/02/2024 2.5  0.0 - 6.0 % Final    Basophils % 10/02/2024 0.4  0.0 - 2.0 % Final    Neutrophils Absolute 10/02/2024 3.05  1.60 - 5.50 x10*3/uL Final    Immature Granulocytes Absolute, Au* 10/02/2024 0.02  0.00 - 0.50 x10*3/uL Final    Lymphocytes Absolute 10/02/2024 1.51  0.80 - 3.00 x10*3/uL Final    Monocytes  Absolute 10/02/2024 0.52  0.05 - 0.80 x10*3/uL Final    Eosinophils Absolute 10/02/2024 0.13  0.00 - 0.40 x10*3/uL Final    Basophils Absolute 10/02/2024 0.02  0.00 - 0.10 x10*3/uL Final    Glucose 10/02/2024 97  74 - 99 mg/dL Final    Sodium 10/02/2024 142  136 - 145 mmol/L Final    Potassium 10/02/2024 4.4  3.5 - 5.3 mmol/L Final    Chloride 10/02/2024 104  98 - 107 mmol/L Final    Bicarbonate 10/02/2024 31  21 - 32 mmol/L Final    Anion Gap 10/02/2024 11  10 - 20 mmol/L Final    Urea Nitrogen 10/02/2024 14  6 - 23 mg/dL Final    Creatinine 10/02/2024 0.85  0.50 - 1.05 mg/dL Final    eGFR 10/02/2024 72  >60 mL/min/1.73m*2 Final    Calcium 10/02/2024 8.9  8.6 - 10.3 mg/dL Final    Albumin 10/02/2024 4.4  3.4 - 5.0 g/dL Final    Alkaline Phosphatase 10/02/2024 103  33 - 136 U/L Final    Total Protein 10/02/2024 6.6  6.4 - 8.2 g/dL Final    AST 10/02/2024 15  9 - 39 U/L Final    Bilirubin, Total 10/02/2024 0.5  0.0 - 1.2 mg/dL Final    ALT 10/02/2024 16  7 - 45 U/L Final    Vitamin B12 10/02/2024 321  211 - 911 pg/mL Final    Hemoglobin A1C 10/02/2024 5.4  See comment % Final    Estimated Average Glucose 10/02/2024 108  Not Established mg/dL Final    Thyroid Stimulating Hormone 10/02/2024 1.95  0.44 - 3.98 mIU/L Final    Sedimentation Rate 10/02/2024 10  0 - 30 mm/h Final    C-Reactive Protein 10/02/2024 0.35  <1.00 mg/dL Final    Vitamin B1, Whole Blood 10/02/2024 103  70 - 180 nmol/L Final    LIU 10/02/2024 Negative  Negative Final         Assessment/Plan   Diagnoses and all orders for this visit:  Allergic contact dermatitis due to other agents  Toxic effect of venom of hornets, unintentional, initial encounter  Anaphylaxis, initial encounter    I recommend avoiding the contact dermatitis chemicals that we discussed. You have to be careful as many of these products can be in everyday items that you may not otherwise be aware of. Please read the allergy avoidance handouts that we provided for help in this regard.  If you to come into contact with the allergen and develop symptoms/rash, please contact my office so we can discuss the appropriate treatment.    Also, I would like you to use the [place] [employer] Contact Dermatitis site on your computer or phone. This can help you find safe products that do not contain the contact allergens to which you reacted. We sent you an email link, as you requested, so you can access the site. It has been personalized to the your specific allergies.    Please refer to the results sheets for the search codes.    Will schedule the venom shots so we can begin treatment     Also will schedule additional allergy testing for environmental allergens:  This patient will return to undergo allergy testing with scratch skin tests. Be sure to be off antihistamines for 3 days. We will check the 1st 5 environmental screening panels.      Leon Deng MD      is being coded for visit complexity inherent to evaluation and management associated with medical care services that serve as the continuing focal point for medical care services that are part of ongoing care related to this patient’s single, serious condition or a complex condition.

## 2025-03-12 ENCOUNTER — TELEPHONE (OUTPATIENT)
Dept: PRIMARY CARE | Facility: CLINIC | Age: 75
End: 2025-03-12
Payer: MEDICARE

## 2025-03-17 ENCOUNTER — APPOINTMENT (OUTPATIENT)
Dept: ALLERGY | Facility: CLINIC | Age: 75
End: 2025-03-17
Payer: MEDICARE

## 2025-03-17 VITALS — DIASTOLIC BLOOD PRESSURE: 89 MMHG | SYSTOLIC BLOOD PRESSURE: 168 MMHG | HEART RATE: 89 BPM

## 2025-03-17 DIAGNOSIS — T63.451A TOXIC EFFECT OF VENOM OF HORNETS, UNINTENTIONAL, INITIAL ENCOUNTER: ICD-10-CM

## 2025-03-17 DIAGNOSIS — J31.0 CHRONIC RHINITIS: Primary | ICD-10-CM

## 2025-03-17 DIAGNOSIS — H10.45 CHRONIC ALLERGIC CONJUNCTIVITIS: ICD-10-CM

## 2025-03-17 PROCEDURE — 99214 OFFICE O/P EST MOD 30 MIN: CPT | Performed by: ALLERGY & IMMUNOLOGY

## 2025-03-17 PROCEDURE — 1123F ACP DISCUSS/DSCN MKR DOCD: CPT | Performed by: ALLERGY & IMMUNOLOGY

## 2025-03-17 PROCEDURE — 95004 PERQ TESTS W/ALRGNC XTRCS: CPT | Performed by: ALLERGY & IMMUNOLOGY

## 2025-03-17 NOTE — PROGRESS NOTES
Subjective   Patient ID:   [id number]   Odette Nicholosn is a 74 y.o. female who presents for Allergy Testing (Here for allergy testing.).    Chief Complaint   Patient presents with    Allergy Testing     Here for allergy testing.          HPI  This patient is here to evaluate for:  Allergic rhinitis and conjunctivitis   Eyes swelled, raspy, so stopped and washed her face.   Had to go back home and never made it to [place].    Triggered by pollen including pine trees such as in [facility].     She is also aware about cat allergy.    Requesting allergy shots    Spring to fall and she can detect when cottonwood and goldenrod are blooming.     Discussed her concomitant eating advised by an Allergist she saw in the past (not eating certain foods in certain times of the year).   Avoids peas in the spring, pork in certain times of the year. She does it automatically so will try to find the list. This has significantly helped her reduce allergy symptoms.    We discussed again that we can start venom shots.      Review of Systems   All other systems reviewed and are negative.        Objective     /89 (BP Location: Left arm, Patient Position: Sitting)   Pulse 89      Physical Exam  Constitutional:       Appearance: Normal appearance.   HENT:      Head: Normocephalic and atraumatic.   Eyes:      Extraocular Movements: Extraocular movements intact.      Conjunctiva/sclera: Conjunctivae normal.      Pupils: Pupils are equal, round, and reactive to light.   Pulmonary:      Effort: Pulmonary effort is normal.   Musculoskeletal:      Cervical back: Normal range of motion.   Neurological:      Mental Status: She is alert and oriented to person, place, and time. Mental status is at baseline.   Psychiatric:         Mood and Affect: Mood normal.         Behavior: Behavior normal.         Thought Content: Thought content normal.         Judgment: Judgment normal.          Current Outpatient Medications   Medication Sig Dispense  Refill    [place] Thyroid 60 mg tablet TAKE 1 TABLET DAILY ON AN EMPTY STOMACH 90 tablet 3    camphor-menthoL (Sarna OriginaL) lotion Apply topically if needed for itching. 222 mL 0    cholecalciferol (Vitamin D-3) 50,000 unit capsule TAKE 1 CAPSULE BY MOUTH EVERY WEEK 12 capsule 3    desoximetasone (Topicort) 0.05 % cream Apply 1 application. topically 2 times a day.      EPINEPHrine (EpiPen 2-Eliecer) 0.3 mg/0.3 mL injection syringe Inject 0.3 mL (0.3 mg) into the muscle 1 time if needed for anaphylaxis for up to 1 dose. As Directed 2 each 3    eucerin (CeraVe) cream Apply 1 Application topically once daily as needed for dry skin.      fexofenadine (Allegra Allergy) 60 mg tablet Take 1 tablet (60 mg) by mouth once daily.      hydrocortisone 1 % cream Apply 1 Application topically 2 times a day.      hydrOXYzine HCL (Atarax) 25 mg tablet Take 1 tablet (25 mg) by mouth every 8 hours if needed for itching. 90 tablet 11    triamcinolone (Kenalog) 0.5 % cream Apply topically every 12 hours. 15 g 3    Vagifem 10 mcg tablet vaginal tablet Insert 1 tablet (10 mcg) into the vagina 3 (three) times a week. 36 tablet 3     No current facility-administered medications for this visit.       Summary of the labs over the past 6 months:    Lab on 10/02/2024   Component Date Value Ref Range Status    WBC 10/02/2024 5.3  4.4 - 11.3 x10*3/uL Final    nRBC 10/02/2024 0.0  0.0 - 0.0 /100 WBCs Final    RBC 10/02/2024 5.32 (H)  4.00 - 5.20 x10*6/uL Final    Hemoglobin 10/02/2024 13.3  12.0 - 16.0 g/dL Final    Hematocrit 10/02/2024 42.8  36.0 - 46.0 % Final    MCV 10/02/2024 81  80 - 100 fL Final    MCH 10/02/2024 25.0 (L)  26.0 - 34.0 pg Final    MCHC 10/02/2024 31.1 (L)  32.0 - 36.0 g/dL Final    RDW 10/02/2024 17.2 (H)  11.5 - 14.5 % Final    Platelets 10/02/2024 247  150 - 450 x10*3/uL Final    Neutrophils % 10/02/2024 58.0  40.0 - 80.0 % Final    Immature Granulocytes %, Automated 10/02/2024 0.4  0.0 - 0.9 % Final    Lymphocytes %  10/02/2024 28.8  13.0 - 44.0 % Final    Monocytes % 10/02/2024 9.9  2.0 - 10.0 % Final    Eosinophils % 10/02/2024 2.5  0.0 - 6.0 % Final    Basophils % 10/02/2024 0.4  0.0 - 2.0 % Final    Neutrophils Absolute 10/02/2024 3.05  1.60 - 5.50 x10*3/uL Final    Immature Granulocytes Absolute, Au* 10/02/2024 0.02  0.00 - 0.50 x10*3/uL Final    Lymphocytes Absolute 10/02/2024 1.51  0.80 - 3.00 x10*3/uL Final    Monocytes Absolute 10/02/2024 0.52  0.05 - 0.80 x10*3/uL Final    Eosinophils Absolute 10/02/2024 0.13  0.00 - 0.40 x10*3/uL Final    Basophils Absolute 10/02/2024 0.02  0.00 - 0.10 x10*3/uL Final    Glucose 10/02/2024 97  74 - 99 mg/dL Final    Sodium 10/02/2024 142  136 - 145 mmol/L Final    Potassium 10/02/2024 4.4  3.5 - 5.3 mmol/L Final    Chloride 10/02/2024 104  98 - 107 mmol/L Final    Bicarbonate 10/02/2024 31  21 - 32 mmol/L Final    Anion Gap 10/02/2024 11  10 - 20 mmol/L Final    Urea Nitrogen 10/02/2024 14  6 - 23 mg/dL Final    Creatinine 10/02/2024 0.85  0.50 - 1.05 mg/dL Final    eGFR 10/02/2024 72  >60 mL/min/1.73m*2 Final    Calcium 10/02/2024 8.9  8.6 - 10.3 mg/dL Final    Albumin 10/02/2024 4.4  3.4 - 5.0 g/dL Final    Alkaline Phosphatase 10/02/2024 103  33 - 136 U/L Final    Total Protein 10/02/2024 6.6  6.4 - 8.2 g/dL Final    AST 10/02/2024 15  9 - 39 U/L Final    Bilirubin, Total 10/02/2024 0.5  0.0 - 1.2 mg/dL Final    ALT 10/02/2024 16  7 - 45 U/L Final    Vitamin B12 10/02/2024 321  211 - 911 pg/mL Final    Hemoglobin A1C 10/02/2024 5.4  See comment % Final    Estimated Average Glucose 10/02/2024 108  Not Established mg/dL Final    Thyroid Stimulating Hormone 10/02/2024 1.95  0.44 - 3.98 mIU/L Final    Sedimentation Rate 10/02/2024 10  0 - 30 mm/h Final    C-Reactive Protein 10/02/2024 0.35  <1.00 mg/dL Final    Vitamin B1, Whole Blood 10/02/2024 103  70 - 180 nmol/L Final    LIU 10/02/2024 Negative  Negative Final     Scratch skin tests were positive to: very low positive to dust mites,  trees and weeds.    Assessment/Plan   Diagnoses and all orders for this visit:  Chronic rhinitis  Chronic allergic conjunctivitis  Toxic effect of venom of hornets, unintentional, initial encounter    We performed allergy testing to help determine the etiology of your symptoms. We discussed the results of the testing. Also, we started to focus on treating your problem and we reviewed the management plan.    At this time, allergy shots would not be helpful based on your testing.  I understand your strong desire to restart shots. It would be reasonable to recheck your skin testing in 6 months. There can be some variability in testing so this would improve our precision to obtain additional data to provide you with the correct recommendation.        Leon Deng MD

## 2025-03-19 ENCOUNTER — APPOINTMENT (OUTPATIENT)
Dept: ALLERGY | Facility: CLINIC | Age: 75
End: 2025-03-19
Payer: MEDICARE

## 2025-03-19 DIAGNOSIS — G89.29 CHRONIC LOW BACK PAIN, UNSPECIFIED BACK PAIN LATERALITY, UNSPECIFIED WHETHER SCIATICA PRESENT: ICD-10-CM

## 2025-03-19 DIAGNOSIS — M54.50 CHRONIC LOW BACK PAIN, UNSPECIFIED BACK PAIN LATERALITY, UNSPECIFIED WHETHER SCIATICA PRESENT: ICD-10-CM

## 2025-03-24 ENCOUNTER — APPOINTMENT (OUTPATIENT)
Dept: ENDOCRINOLOGY | Facility: CLINIC | Age: 75
End: 2025-03-24
Payer: MEDICARE

## 2025-03-24 VITALS
BODY MASS INDEX: 37.95 KG/M2 | RESPIRATION RATE: 16 BRPM | DIASTOLIC BLOOD PRESSURE: 70 MMHG | HEART RATE: 76 BPM | WEIGHT: 214.2 LBS | HEIGHT: 63 IN | SYSTOLIC BLOOD PRESSURE: 120 MMHG

## 2025-03-24 DIAGNOSIS — E89.0 POSTOPERATIVE HYPOTHYROIDISM: Primary | ICD-10-CM

## 2025-03-24 DIAGNOSIS — R13.10 DYSPHAGIA, UNSPECIFIED TYPE: ICD-10-CM

## 2025-03-24 DIAGNOSIS — E04.2 MULTINODULAR GOITER: ICD-10-CM

## 2025-03-24 PROCEDURE — 1036F TOBACCO NON-USER: CPT | Performed by: INTERNAL MEDICINE

## 2025-03-24 PROCEDURE — 1123F ACP DISCUSS/DSCN MKR DOCD: CPT | Performed by: INTERNAL MEDICINE

## 2025-03-24 PROCEDURE — 99214 OFFICE O/P EST MOD 30 MIN: CPT | Performed by: INTERNAL MEDICINE

## 2025-03-24 PROCEDURE — 3008F BODY MASS INDEX DOCD: CPT | Performed by: INTERNAL MEDICINE

## 2025-03-24 PROCEDURE — G2211 COMPLEX E/M VISIT ADD ON: HCPCS | Performed by: INTERNAL MEDICINE

## 2025-03-24 PROCEDURE — 1159F MED LIST DOCD IN RCRD: CPT | Performed by: INTERNAL MEDICINE

## 2025-03-24 PROCEDURE — 1160F RVW MEDS BY RX/DR IN RCRD: CPT | Performed by: INTERNAL MEDICINE

## 2025-03-24 RX ORDER — TRIAMCINOLONE ACETONIDE 1 MG/G
CREAM TOPICAL
COMMUNITY
Start: 2024-07-22

## 2025-03-24 ASSESSMENT — ENCOUNTER SYMPTOMS
FATIGUE: 0
NAUSEA: 0
PALPITATIONS: 0
VOMITING: 0
CHILLS: 0
DIARRHEA: 0
HEADACHES: 0
COUGH: 0
FEVER: 0
SHORTNESS OF BREATH: 0

## 2025-03-24 NOTE — ASSESSMENT & PLAN NOTE
Due for us.   Unlikely cause of sxs given us from last year and exam  Orders:    US thyroid; Future

## 2025-03-24 NOTE — PROGRESS NOTES
Endocrinology: Follow up visit  Subjective   Patient ID: Odette Nicholson is a 75 y.o. female who presents for Hypothyroidism.    PCP: Angela Tang MD    HPI  Hypo s/p hemitx  Levothyroxine intolerant due to severe lactose intol; stable on armour  Since last visit a year ago still having issues with swallowing and throat.  Notes certain temperatures bother it most like cold beverages; has not seen ent  She is concerned thyroid related.  Last years us showed small tr 3 nodule  Taking armour as directed  Review of Systems   Constitutional:  Negative for chills, fatigue and fever.   Respiratory:  Negative for cough and shortness of breath.    Cardiovascular:  Negative for chest pain and palpitations.   Gastrointestinal:  Negative for diarrhea, nausea and vomiting.   Neurological:  Negative for headaches.       Patient Active Problem List   Diagnosis    Postoperative hypothyroidism    Prediabetes    Multinodular goiter    Hypothyroidism (acquired)        Home Meds:  Current Outpatient Medications   Medication Instructions    [place] Thyroid 60 mg, oral, Daily before breakfast, TAKE ON AN EMPTY STOMACH    camphor-menthoL (Sarna OriginaL) lotion Topical, As needed    cholecalciferol (VITAMIN D-3) 50,000 Units, oral, Once Weekly    desoximetasone (Topicort) 0.05 % cream 1 Application, 2 times daily    EpiPen 2-Eliecer 0.3 mg, intramuscular, Once as needed, As Directed    eucerin (CeraVe) cream 1 Application, Daily PRN    fexofenadine (Allegra Allergy) 60 mg tablet 1 tablet, Daily    hydrocortisone 1 % cream 1 Application, 2 times daily    hydrOXYzine HCL (ATARAX) 25 mg, oral, Every 8 hours PRN    triamcinolone (Kenalog) 0.1 % cream APPLY TO THE AFFECTED AREA ON BODY TWICE DAILY AS NEEDED FLARES. STOP WHEN CLEAR.    triamcinolone (Kenalog) 0.5 % cream Topical, Every 12 hours    Vagifem 10 mcg, vaginal, 3 times weekly        Allergies   Allergen Reactions    Latex Anaphylaxis     breathing difficulties, rash    Penicillins  "Anaphylaxis     quit breathing    Shellfish Derived Anaphylaxis    Acetaminophen Unknown    Antipyrine-Benzocaine Swelling    Bacitracin Unknown    Carbamide Peroxide Swelling    Diphenhydramine Hcl Unknown    Ibuprofen Unknown    Iodine Unknown    Nsaids (Non-Steroidal Anti-Inflammatory Drug) Headache    Neomycin Rash    Sulfa (Sulfonamide Antibiotics) Rash     breathing problems and rash        Objective   Vitals:    03/24/25 1407   BP: 120/70   Pulse: 76   Resp: 16      Vitals:    03/24/25 1407   Weight: 97.2 kg (214 lb 3.2 oz)      Body mass index is 37.94 kg/m².   Physical Exam  Constitutional:       Appearance: Normal appearance. She is overweight.   HENT:      Head: Normocephalic and atraumatic.   Neck:      Thyroid: No thyroid mass, thyromegaly or thyroid tenderness.      Comments: Small thyroid low in the neck  Cardiovascular:      Rate and Rhythm: Normal rate and regular rhythm.      Heart sounds: No murmur heard.     No gallop.   Pulmonary:      Effort: Pulmonary effort is normal.      Breath sounds: Normal breath sounds.   Abdominal:      Palpations: Abdomen is soft.      Comments: benign   Neurological:      General: No focal deficit present.      Mental Status: She is alert and oriented to person, place, and time.      Deep Tendon Reflexes: Reflexes are normal and symmetric.   Psychiatric:         Behavior: Behavior is cooperative.         Labs:  Lab Results   Component Value Date    HGBA1C 5.4 10/02/2024    TSH 1.95 10/02/2024    FREET4 0.90 07/02/2019      No results found for: \"PR1\", \"THYROIDPAB\", \"TSI\"     Assessment/Plan   Assessment & Plan  Multinodular goiter  Due for us.   Unlikely cause of sxs given us from last year and exam  Orders:    US thyroid; Future    Postoperative hypothyroidism  Recheck tsh  Orders:    TSH with reflex to Free T4 if abnormal; Future    Dysphagia, unspecified type  Will refer to ent: seems unlike thyroid related  Orders:    Referral to ENT; Future        Electronically " signed by:  Pippa Maya MD 03/24/25 2:08 PM

## 2025-03-27 ENCOUNTER — HOSPITAL ENCOUNTER (OUTPATIENT)
Dept: RADIOLOGY | Facility: CLINIC | Age: 75
Discharge: HOME | End: 2025-03-27
Payer: MEDICARE

## 2025-03-27 VITALS — BODY MASS INDEX: 37.92 KG/M2 | WEIGHT: 214 LBS | HEIGHT: 63 IN

## 2025-03-27 PROCEDURE — 77063 BREAST TOMOSYNTHESIS BI: CPT | Performed by: RADIOLOGY

## 2025-03-27 PROCEDURE — 77067 SCR MAMMO BI INCL CAD: CPT

## 2025-03-27 PROCEDURE — 77067 SCR MAMMO BI INCL CAD: CPT | Performed by: RADIOLOGY

## 2025-03-28 ENCOUNTER — APPOINTMENT (OUTPATIENT)
Dept: RADIOLOGY | Facility: HOSPITAL | Age: 75
End: 2025-03-28
Payer: MEDICARE

## 2025-03-31 ENCOUNTER — HOSPITAL ENCOUNTER (OUTPATIENT)
Dept: RADIOLOGY | Facility: HOSPITAL | Age: 75
Discharge: HOME | End: 2025-03-31
Payer: MEDICARE

## 2025-03-31 ENCOUNTER — APPOINTMENT (OUTPATIENT)
Dept: RADIOLOGY | Facility: HOSPITAL | Age: 75
End: 2025-03-31
Payer: MEDICARE

## 2025-03-31 DIAGNOSIS — E04.2 MULTINODULAR GOITER: ICD-10-CM

## 2025-03-31 PROCEDURE — 76536 US EXAM OF HEAD AND NECK: CPT | Performed by: RADIOLOGY

## 2025-03-31 PROCEDURE — 76536 US EXAM OF HEAD AND NECK: CPT

## 2025-04-01 ENCOUNTER — PATIENT MESSAGE (OUTPATIENT)
Dept: ENDOCRINOLOGY | Facility: CLINIC | Age: 75
End: 2025-04-01
Payer: MEDICARE

## 2025-04-01 DIAGNOSIS — E03.9 HYPOTHYROIDISM (ACQUIRED): ICD-10-CM

## 2025-04-01 LAB — TSH SERPL-ACNC: 2 MIU/L (ref 0.4–4.5)

## 2025-04-01 RX ORDER — THYROID 60 MG/1
60 TABLET ORAL
Qty: 90 TABLET | Refills: 3 | Status: SHIPPED | OUTPATIENT
Start: 2025-04-01

## 2025-04-07 ENCOUNTER — APPOINTMENT (OUTPATIENT)
Dept: OTOLARYNGOLOGY | Facility: CLINIC | Age: 75
End: 2025-04-07
Payer: MEDICARE

## 2025-04-07 VITALS — TEMPERATURE: 97.5 F | HEIGHT: 63 IN | WEIGHT: 205 LBS | BODY MASS INDEX: 36.32 KG/M2

## 2025-04-07 DIAGNOSIS — G47.33 OBSTRUCTIVE SLEEP APNEA: ICD-10-CM

## 2025-04-07 DIAGNOSIS — E04.1 LEFT THYROID NODULE: ICD-10-CM

## 2025-04-07 DIAGNOSIS — J02.9 SORE THROAT: ICD-10-CM

## 2025-04-07 DIAGNOSIS — R13.10 DYSPHAGIA, UNSPECIFIED TYPE: ICD-10-CM

## 2025-04-07 DIAGNOSIS — E04.1 THYROID NODULE: ICD-10-CM

## 2025-04-07 DIAGNOSIS — R40.0 DAYTIME SOMNOLENCE: ICD-10-CM

## 2025-04-07 DIAGNOSIS — R49.0 HOARSENESS: Primary | ICD-10-CM

## 2025-04-07 DIAGNOSIS — G47.33 OSA (OBSTRUCTIVE SLEEP APNEA): ICD-10-CM

## 2025-04-07 PROCEDURE — 1159F MED LIST DOCD IN RCRD: CPT | Performed by: OTOLARYNGOLOGY

## 2025-04-07 PROCEDURE — 3008F BODY MASS INDEX DOCD: CPT | Performed by: OTOLARYNGOLOGY

## 2025-04-07 PROCEDURE — 1036F TOBACCO NON-USER: CPT | Performed by: OTOLARYNGOLOGY

## 2025-04-07 PROCEDURE — 1123F ACP DISCUSS/DSCN MKR DOCD: CPT | Performed by: OTOLARYNGOLOGY

## 2025-04-07 PROCEDURE — 31575 DIAGNOSTIC LARYNGOSCOPY: CPT | Performed by: OTOLARYNGOLOGY

## 2025-04-07 PROCEDURE — 99203 OFFICE O/P NEW LOW 30 MIN: CPT | Performed by: OTOLARYNGOLOGY

## 2025-04-07 NOTE — PROGRESS NOTES
Chief Complaint   Patient presents with    New Patient Visit     THYROID CONSULT REFERRED BY DR. COTNRERAS RT.LOBECTOMY, F/U US IN CHART     HPI:  Odette Nicholson is a 75 y.o. female who with complaints over the past year of severe fatigue, some hoarseness, dysphagia at times especially with cold liquids, throat discomfort.  Currently with some ear pain as well.  She states her symptoms are similar to prior when she had right thyroid lobectomy for benign disease.  She does see endocrine and recently had normal thyroid function testing.  Also had ultrasound of the thyroid which showed 2 dominant nodules within the left lobe as well as 1 in the isthmus.  --  US THYROID;  3/31/2025       FINDINGS:      RIGHT LOBE:  The right lobe of the thyroid is surgically absent.      LEFT LOBE:  The left lobe of the thyroid measures 2.1 x 2.7 x 5.3. The left lobe  of the thyroid is heterogeneous and contains multiple nodules.  Heterogeneous indistinct solid nodule inferiorly measuring 18 x 18 x  19 mm. This is TR 4. This may be new since the prior exam... Another  spongiform TR 1 nodule superiorly measuring 11 x 8 x 10 mm. There is  a mostly hypoechoic solid nodule measuring 20 x 19 x 17 mm and  slightly larger since the prior exam. This is TR 4.      ISTHMUS:  The isthmus measures approximately 1.0 and is heterogeneous. It  contains a hypoechoic solid nodule. This is heterogeneous. It is  approximately 26 x 15 x 19 mm. This is larger than the prior exam.  This is TR 4.      CERVICAL LYMPH NODES:  No significant adenopathy.      IMPRESSION:  Status post thyroidectomy with heterogeneous and enlarged isthmus and  left lobe containing multiple nodules as described.  --  PMH:  Past Medical History:   Diagnosis Date    Allergic rhinitis 1958    Anaphylaxis Dec 25, 1964    Atopic dermatitis Sept 9, 2024    Food intolerance 1985    Nasal polyps 1971    Pneumonia 1955    Unspecified cataract     Cataracts, both eyes    Vitreous degeneration,  right eye     PVD (posterior vitreous detachment), right eye     Past Surgical History:   Procedure Laterality Date    HAND SURGERY  06/04/2013    Hand Surgery                                                                                                                                                          HYSTERECTOMY  06/04/2013    Hysterectomy    TONSILLECTOMY  06/04/2013    Tonsillectomy         Medications:     Current Outpatient Medications:     camphor-menthoL (Sarna OriginaL) lotion, Apply topically if needed for itching., Disp: 222 mL, Rfl: 0    cholecalciferol (Vitamin D-3) 50,000 unit capsule, TAKE 1 CAPSULE BY MOUTH EVERY WEEK, Disp: 12 capsule, Rfl: 3    EPINEPHrine (EpiPen 2-Eliecer) 0.3 mg/0.3 mL injection syringe, Inject 0.3 mL (0.3 mg) into the muscle 1 time if needed for anaphylaxis for up to 1 dose. As Directed, Disp: 2 each, Rfl: 3    eucerin (CeraVe) cream, Apply 1 Application topically once daily as needed for dry skin., Disp: , Rfl:     fexofenadine (Allegra Allergy) 60 mg tablet, Take 1 tablet (60 mg) by mouth once daily., Disp: , Rfl:     hydrocortisone 1 % cream, Apply 1 Application topically 2 times a day., Disp: , Rfl:     thyroid, pork, ([place] Thyroid) 60 mg tablet, Take 1 tablet (60 mg) by mouth once daily in the morning. Take before meals. TAKE ON AN EMPTY STOMACH, Disp: 90 tablet, Rfl: 3    Vagifem 10 mcg tablet vaginal tablet, Insert 1 tablet (10 mcg) into the vagina 3 (three) times a week., Disp: 36 tablet, Rfl: 3    desoximetasone (Topicort) 0.05 % cream, Apply 1 application. topically 2 times a day. (Patient not taking: Reported on 3/24/2025), Disp: , Rfl:     hydrOXYzine HCL (Atarax) 25 mg tablet, Take 1 tablet (25 mg) by mouth every 8 hours if needed for itching. (Patient not taking: Reported on 3/24/2025), Disp: 90 tablet, Rfl: 11    triamcinolone (Kenalog) 0.1 % cream, APPLY TO THE AFFECTED AREA ON BODY TWICE DAILY AS NEEDED FLARES. STOP WHEN CLEAR. (Patient not taking:  "Reported on 3/24/2025), Disp: , Rfl:     triamcinolone (Kenalog) 0.5 % cream, Apply topically every 12 hours. (Patient not taking: Reported on 3/24/2025), Disp: 15 g, Rfl: 3     Allergies:  Allergies   Allergen Reactions    Latex Anaphylaxis     breathing difficulties, rash    Penicillins Anaphylaxis     quit breathing    Shellfish Derived Anaphylaxis    Acetaminophen Unknown    Antipyrine-Benzocaine Swelling    Bacitracin Unknown    Carbamide Peroxide Swelling    Diphenhydramine Hcl Unknown    Ibuprofen Unknown    Iodine Unknown    Nsaids (Non-Steroidal Anti-Inflammatory Drug) Headache    Neomycin Rash    Sulfa (Sulfonamide Antibiotics) Rash     breathing problems and rash        ROS:  Review of systems normal unless stated otherwise in the HPI and/or PMH.    Physical Exam:  Temperature 36.4 °C (97.5 °F), height 1.6 m (5' 3\"), weight 93 kg (205 lb). Body mass index is 36.31 kg/m².     GENERAL APPEARANCE: Well developed and well nourished.  Alert and oriented in no acute distress.  Normal vocal quality.      HEAD/FACE: No erythema or edema or facial tenderness.  Normal facial nerve function bilaterally.    EAR:       EXTERNAL: Normal pinnas and external auditory canals without lesion or obstructing wax.  Some skin irritation on the back bilaterally where she states the straps from her mask are rubbing.  Mild wax on the left.       MIDDLE EAR: Tympanic membranes intact and mobile with normal landmarks.  Middle ear space appears well aerated.       TUBE STATUS: N/A       MASTOID CAVITY: N/A       HEARING: Gross hearing assessment is within normal limits.      NOSE:       VISUALIZED USING: Anterior rhinoscopy with headlight and nasal speculum.       DORSUM: Midline, nontraumatic appearance.       MUCOSA: Normal-appearing.       SECRETIONS: Normal.       SEPTUM: Midline and nonobstructing.       INFERIOR TURBINATES: Normal.       MIDDLE TURBINATES/MEATUS: N/A       BLEEDING: N/A         ORAL CAVITY/PHARYNX:       TEETH: " Adequate dentition.       TONGUE: No mass or lesion.  Normal mobility.       FLOOR OF MOUTH: No mass or lesion.       PALATE: Normal hard palate, soft palate, and uvula.       OROPHARYNX: Normal without mass or lesion.       BUCCAL MUCOSA/GBS: Normal without mass or lesion.       LIPS: Normal.    LARYNX/HYPOPHARYNX/NASOPHARYNX: Flexible laryngoscopy was performed after consent secondary to an inadequate mirror examination.  The flexible laryngoscope was placed through the nasal cavity revealing normal nasopharynx, normal oropharynx, normal hypopharynx, and normal larynx.  There is normal bilateral vocal cord mobility without any mucosal masses or lesions.    NECK: No palpable masses or abnormal adenopathy.  Trachea is midline.    THYROID: No thyromegaly or palpable nodule.    SALIVARY GLANDS: Normal bilateral parotid and submandibular glands by inspection and palpation.    TMJ's: Normal.    NEURO: Cranial nerve exam grossly normal bilaterally.       Assessment/Plan   Odette was seen today for new patient visit.  Diagnoses and all orders for this visit:  Hoarseness (Primary)  Dysphagia, unspecified type  Sore throat  Left thyroid nodule  Daytime somnolence  Obstructive sleep apnea     Has 3 dominant thyroid nodules.  Educated about this and I do recommend ultrasound-guided fine-needle aspiration biopsies.  Educated I do not believe her thyroid nodules are contributing to any of her current symptoms.  Her fatigue may be related to obstructive sleep apnea therefore I do recommend obtaining a sleep study.  Continue to follow-up with her PCP for difficulty breathing issues.  Follow up for test results after testing is complete.     Teofilo Juares MD

## 2025-04-24 ENCOUNTER — OFFICE VISIT (OUTPATIENT)
Dept: OBSTETRICS AND GYNECOLOGY | Facility: CLINIC | Age: 75
End: 2025-04-24
Payer: MEDICARE

## 2025-04-24 VITALS
SYSTOLIC BLOOD PRESSURE: 135 MMHG | WEIGHT: 212 LBS | HEIGHT: 63 IN | DIASTOLIC BLOOD PRESSURE: 80 MMHG | BODY MASS INDEX: 37.56 KG/M2

## 2025-04-24 DIAGNOSIS — N95.2 VAGINAL ATROPHY: ICD-10-CM

## 2025-04-24 DIAGNOSIS — Z12.31 ENCOUNTER FOR SCREENING MAMMOGRAM FOR MALIGNANT NEOPLASM OF BREAST: ICD-10-CM

## 2025-04-24 DIAGNOSIS — Z78.0 MENOPAUSE: Primary | ICD-10-CM

## 2025-04-24 PROCEDURE — 3008F BODY MASS INDEX DOCD: CPT

## 2025-04-24 PROCEDURE — 1036F TOBACCO NON-USER: CPT

## 2025-04-24 PROCEDURE — 1160F RVW MEDS BY RX/DR IN RCRD: CPT

## 2025-04-24 PROCEDURE — 99212 OFFICE O/P EST SF 10 MIN: CPT

## 2025-04-24 PROCEDURE — 1123F ACP DISCUSS/DSCN MKR DOCD: CPT

## 2025-04-24 PROCEDURE — 1126F AMNT PAIN NOTED NONE PRSNT: CPT

## 2025-04-24 PROCEDURE — 1159F MED LIST DOCD IN RCRD: CPT

## 2025-04-24 PROCEDURE — 99202 OFFICE O/P NEW SF 15 MIN: CPT

## 2025-04-24 ASSESSMENT — PAIN SCALES - GENERAL: PAINLEVEL_OUTOF10: 0-NO PAIN

## 2025-04-24 ASSESSMENT — ENCOUNTER SYMPTOMS
OCCASIONAL FEELINGS OF UNSTEADINESS: 0
DIZZINESS: 0
HEADACHES: 0
DEPRESSION: 0
FEVER: 0
VOMITING: 0
COUGH: 0
LOSS OF SENSATION IN FEET: 0
COLOR CHANGE: 0
ABDOMINAL PAIN: 0
DYSURIA: 0
UNEXPECTED WEIGHT CHANGE: 0
NAUSEA: 0
SHORTNESS OF BREATH: 0
CHILLS: 0
FATIGUE: 0

## 2025-04-24 ASSESSMENT — LIFESTYLE VARIABLES
HOW MANY STANDARD DRINKS CONTAINING ALCOHOL DO YOU HAVE ON A TYPICAL DAY: PATIENT DOES NOT DRINK
SKIP TO QUESTIONS 9-10: 1
HOW OFTEN DO YOU HAVE A DRINK CONTAINING ALCOHOL: NEVER
HOW OFTEN DO YOU HAVE SIX OR MORE DRINKS ON ONE OCCASION: NEVER
AUDIT-C TOTAL SCORE: 0

## 2025-04-24 ASSESSMENT — PATIENT HEALTH QUESTIONNAIRE - PHQ9
2. FEELING DOWN, DEPRESSED OR HOPELESS: NOT AT ALL
SUM OF ALL RESPONSES TO PHQ9 QUESTIONS 1 & 2: 0
1. LITTLE INTEREST OR PLEASURE IN DOING THINGS: NOT AT ALL

## 2025-04-24 NOTE — PROGRESS NOTES
"Subjective   Odette Nicholson is a 75 y.o. female who is here for a routine menopausal exam. Last saw Dr. Pickard 2021.   - History of hysterectomy; denies any pelvic pain or pelvic symptoms.  - Denies any breast changes or concerns today.  - History of vaginal atrophy for which she uses Vagifem tablets regularly.   - Has upcoming thyroid needle biopsy  and will be pursuing physical therapy again.     Complaints:   none  HRT: no  History of abnormal Pap smear: no  History of abnormal mammogram: no      OB History          1    Para   0    Term   0            AB   1    Living             SAB        IAB        Ectopic        Multiple        Live Births                      Review of Systems   Constitutional:  Negative for chills, fatigue, fever and unexpected weight change.   Respiratory:  Negative for cough and shortness of breath.    Gastrointestinal:  Negative for abdominal pain, nausea and vomiting.   Genitourinary:  Negative for dyspareunia, dysuria, menstrual problem, pelvic pain and vaginal discharge.   Skin:  Negative for color change and rash.   Neurological:  Negative for dizziness and headaches.       Objective   /80   Ht 1.6 m (5' 3\")   Wt 96.2 kg (212 lb)   BMI 37.55 kg/m²        General:   Alert and oriented, in no acute distress   Neck: Supple. No visible thyromegaly.    Breast/Axilla: Normal to palpation bilaterally without masses, skin changes, or nipple discharge.    Abdomen: Soft, non-tender, without masses or organomegaly   Vulva: Normal architecture without erythema, masses, or lesions.    Vagina: Normal mucosa without lesions or masses. No abnormal vaginal discharge.    Cervix: Surgically absent   Uterus: Surgically absent   Adnexa: Normal without masses or lesions   Pelvic Floor Normal    Psych Normal affect. Normal mood.      Assessment/Plan   Diagnoses and all orders for this visit:  Menopause   - No further need for Pap smear based on age and history; s/p " hysterectomy.  Encounter for screening mammogram for malignant neoplasm of breast   - UTD on mammogram 3/27/25.   Vaginal atrophy   - Managed with Vagifem; she will check if she needs further refills and contact me if needed.     Romina Lira PA-C

## 2025-04-27 ENCOUNTER — PROCEDURE VISIT (OUTPATIENT)
Dept: SLEEP MEDICINE | Facility: HOSPITAL | Age: 75
End: 2025-04-27
Payer: MEDICARE

## 2025-04-27 DIAGNOSIS — G47.33 OSA (OBSTRUCTIVE SLEEP APNEA): ICD-10-CM

## 2025-04-27 PROCEDURE — 95806 SLEEP STUDY UNATT&RESP EFFT: CPT | Performed by: STUDENT IN AN ORGANIZED HEALTH CARE EDUCATION/TRAINING PROGRAM

## 2025-04-29 ENCOUNTER — APPOINTMENT (OUTPATIENT)
Dept: ALLERGY | Facility: CLINIC | Age: 75
End: 2025-04-29
Payer: MEDICARE

## 2025-04-29 ENCOUNTER — TELEMEDICINE (OUTPATIENT)
Dept: PRIMARY CARE | Facility: CLINIC | Age: 75
End: 2025-04-29
Payer: MEDICARE

## 2025-04-29 VITALS — WEIGHT: 212 LBS | BODY MASS INDEX: 37.56 KG/M2 | HEIGHT: 63 IN

## 2025-04-29 DIAGNOSIS — J01.10 ACUTE NON-RECURRENT FRONTAL SINUSITIS: Primary | ICD-10-CM

## 2025-04-29 PROCEDURE — 1126F AMNT PAIN NOTED NONE PRSNT: CPT | Performed by: INTERNAL MEDICINE

## 2025-04-29 PROCEDURE — 1159F MED LIST DOCD IN RCRD: CPT | Performed by: INTERNAL MEDICINE

## 2025-04-29 PROCEDURE — 1123F ACP DISCUSS/DSCN MKR DOCD: CPT | Performed by: INTERNAL MEDICINE

## 2025-04-29 PROCEDURE — G2211 COMPLEX E/M VISIT ADD ON: HCPCS | Performed by: INTERNAL MEDICINE

## 2025-04-29 PROCEDURE — 3008F BODY MASS INDEX DOCD: CPT | Performed by: INTERNAL MEDICINE

## 2025-04-29 PROCEDURE — 99213 OFFICE O/P EST LOW 20 MIN: CPT | Performed by: INTERNAL MEDICINE

## 2025-04-29 RX ORDER — AZITHROMYCIN 250 MG/1
TABLET, FILM COATED ORAL
Qty: 6 TABLET | Refills: 0 | Status: SHIPPED | OUTPATIENT
Start: 2025-04-29 | End: 2025-05-04

## 2025-04-29 RX ORDER — METHYLPREDNISOLONE 4 MG/1
TABLET ORAL
Qty: 21 TABLET | Refills: 0 | Status: SHIPPED | OUTPATIENT
Start: 2025-04-29 | End: 2025-05-05

## 2025-04-29 RX ORDER — CYCLOSPORINE 0.5 MG/ML
1 EMULSION OPHTHALMIC EVERY 12 HOURS
COMMUNITY
Start: 2025-04-28

## 2025-04-29 ASSESSMENT — PAIN SCALES - GENERAL: PAINLEVEL_OUTOF10: 0-NO PAIN

## 2025-04-29 ASSESSMENT — ENCOUNTER SYMPTOMS
OCCASIONAL FEELINGS OF UNSTEADINESS: 0
DEPRESSION: 0
LOSS OF SENSATION IN FEET: 0

## 2025-04-30 ASSESSMENT — ENCOUNTER SYMPTOMS
ARTHRALGIAS: 0
DIARRHEA: 0
COUGH: 1
RHINORRHEA: 1
PALPITATIONS: 0
SINUS PRESSURE: 1
DIZZINESS: 0
CONSTIPATION: 0
SHORTNESS OF BREATH: 0
ABDOMINAL PAIN: 0
NAUSEA: 0

## 2025-04-30 NOTE — PROGRESS NOTES
"Subjective   Patient ID: Odette Nicholson is a 75 y.o. female who presents for URI.    This visit was completed via telephone/virtual visit. All issues as documented below were discussed and addressed but no physical exam was performed. If it was felt that the patient should be evaluated via  face-to-face office appointment(s) they were directed there. The patient verbally consented to this visit. She is complaining of runny nose, headache, sinus drainage for the past week, occasional productive cough.   Denies fever or chills    URI   Associated symptoms include coughing and rhinorrhea. Pertinent negatives include no abdominal pain, chest pain, diarrhea or nausea.        Review of Systems   HENT:  Positive for postnasal drip, rhinorrhea and sinus pressure.    Respiratory:  Positive for cough. Negative for shortness of breath.    Cardiovascular:  Negative for chest pain and palpitations.   Gastrointestinal:  Negative for abdominal pain, constipation, diarrhea and nausea.   Musculoskeletal:  Negative for arthralgias.   Neurological:  Negative for dizziness.       Objective   Ht 1.6 m (5' 3\")   Wt 96.2 kg (212 lb)   BMI 37.55 kg/m²     Physical Exam    Assessment/Plan   Diagnoses and all orders for this visit:  Acute non-recurrent frontal sinusitis  -     azithromycin (Zithromax) 250 mg tablet; Take 2 tablets (500 mg) by mouth once daily for 1 day, THEN 1 tablet (250 mg) once daily for 4 days. Take 2 tabs (500 mg) by mouth today, than 1 daily for 4 days.  -     methylPREDNISolone (Medrol Dospak) 4 mg tablets; Take as directed on package.         "

## 2025-05-01 ENCOUNTER — HOSPITAL ENCOUNTER (OUTPATIENT)
Dept: RADIOLOGY | Facility: HOSPITAL | Age: 75
Discharge: HOME | End: 2025-05-01
Payer: MEDICARE

## 2025-05-01 VITALS
DIASTOLIC BLOOD PRESSURE: 92 MMHG | OXYGEN SATURATION: 98 % | RESPIRATION RATE: 18 BRPM | HEIGHT: 63 IN | SYSTOLIC BLOOD PRESSURE: 154 MMHG | BODY MASS INDEX: 37.56 KG/M2 | TEMPERATURE: 98.6 F | WEIGHT: 212 LBS | HEART RATE: 66 BPM

## 2025-05-01 DIAGNOSIS — E04.1 THYROID NODULE: ICD-10-CM

## 2025-05-01 LAB
ERYTHROCYTE [DISTWIDTH] IN BLOOD BY AUTOMATED COUNT: 13.9 % (ref 11.5–14.5)
HCT VFR BLD AUTO: 43.8 % (ref 36–46)
HGB BLD-MCNC: 14.5 G/DL (ref 12–16)
INR PPP: 1 (ref 0.9–1.2)
MCH RBC QN AUTO: 28 PG (ref 26–34)
MCHC RBC AUTO-ENTMCNC: 33.1 G/DL (ref 32–36)
MCV RBC AUTO: 85 FL (ref 80–100)
NRBC BLD-RTO: 0 /100 WBCS (ref 0–0)
PLATELET # BLD AUTO: 236 X10*3/UL (ref 150–450)
PROTHROMBIN TIME: 11.2 SECONDS (ref 9.3–12.7)
RBC # BLD AUTO: 5.18 X10*6/UL (ref 4–5.2)
WBC # BLD AUTO: 5.9 X10*3/UL (ref 4.4–11.3)

## 2025-05-01 PROCEDURE — 85610 PROTHROMBIN TIME: CPT | Performed by: NURSE PRACTITIONER

## 2025-05-01 PROCEDURE — 10006 FNA BX W/US GDN EA ADDL: CPT

## 2025-05-01 PROCEDURE — 85027 COMPLETE CBC AUTOMATED: CPT | Performed by: NURSE PRACTITIONER

## 2025-05-01 PROCEDURE — 2500000004 HC RX 250 GENERAL PHARMACY W/ HCPCS (ALT 636 FOR OP/ED): Performed by: RADIOLOGY

## 2025-05-01 PROCEDURE — 76942 ECHO GUIDE FOR BIOPSY: CPT | Mod: 51

## 2025-05-01 PROCEDURE — 36415 COLL VENOUS BLD VENIPUNCTURE: CPT | Performed by: NURSE PRACTITIONER

## 2025-05-01 RX ORDER — LIDOCAINE HYDROCHLORIDE 10 MG/ML
INJECTION, SOLUTION EPIDURAL; INFILTRATION; INTRACAUDAL; PERINEURAL
Status: COMPLETED | OUTPATIENT
Start: 2025-05-01 | End: 2025-05-01

## 2025-05-01 RX ADMIN — LIDOCAINE HYDROCHLORIDE 3 ML: 10 INJECTION, SOLUTION EPIDURAL; INFILTRATION; INTRACAUDAL; PERINEURAL at 09:37

## 2025-05-01 RX ADMIN — LIDOCAINE HYDROCHLORIDE 4 ML: 10 INJECTION, SOLUTION EPIDURAL; INFILTRATION; INTRACAUDAL; PERINEURAL at 09:29

## 2025-05-01 ASSESSMENT — COLUMBIA-SUICIDE SEVERITY RATING SCALE - C-SSRS
6. HAVE YOU EVER DONE ANYTHING, STARTED TO DO ANYTHING, OR PREPARED TO DO ANYTHING TO END YOUR LIFE?: NO
1. IN THE PAST MONTH, HAVE YOU WISHED YOU WERE DEAD OR WISHED YOU COULD GO TO SLEEP AND NOT WAKE UP?: NO
2. HAVE YOU ACTUALLY HAD ANY THOUGHTS OF KILLING YOURSELF?: NO

## 2025-05-01 ASSESSMENT — PAIN SCALES - GENERAL
PAINLEVEL_OUTOF10: 0 - NO PAIN

## 2025-05-01 ASSESSMENT — PAIN - FUNCTIONAL ASSESSMENT: PAIN_FUNCTIONAL_ASSESSMENT: 0-10

## 2025-05-01 NOTE — Clinical Note
Left thyroid fna mid lobe passes done.  Pttolerated well.  Bandaids x2 placed to neck.  Pt tolerated well.  Pt denies any discomforts

## 2025-05-01 NOTE — DISCHARGE INSTRUCTIONS
Patient and Family Education  What is a Biopsy or Aspiration?  A biopsy or aspiration is used to help doctors diagnose disease. Small pieces of tissue or cells  are taken from the area using a special kind of needle. The tissue is sent to the lab to be looked  at under a microscope. The procedure can take up to 1 hour.  Before the Test:  ? If you take blood thinning medications, you Must ask your doctor when you should stop  taking them. You may need to stop taking the medicine up to seven (7) days prior to the  test.  ? Do Not Drink or Eat Anything after midnight the day before the test. You may take  medications with a small amount of clear liquid.  ? If you take daily oral diabetic medications, contact your Radiologist or your Radiology  Nurse to determine if you should take your medicine before the test or adjust the dosage.  ? Make plans for a ride home if you are an outpatient.  ? If you have an allergy to iodine or iodinated contrast, your doctor may prescribe special  pills to take before the test.  During the Test:  ? You will lie on a padded X-Ray table.  ? You may be given medicine that will make you drowsy.  ? You will also be given a local anesthetic to numb the biopsy site.  ? You will feel pressure during the biopsy.  After the Test:  ? You will remain on bed rest for 1 to 4 hours.  ? Your blood pressure, pulse and biopsy site will be checked often.  ? If a large sample of tissue needs to be taken, you may be admitted to the hospital for  observation.  Following these instructions for a safer recovery:     Page 2 of 2  Activity:  ? Limit your activity for 24 hours after the test.  ? Do not drive for 24 hours.  ? Do not do any heavy lifting, such as groceries, for 24 hours.  ? Avoid intense exercise and contact sports for 24 hours.  Diet:  ? You may resume your normal diet.  Medicines:  ? If you take medications to thin your blood, ask your doctor when you should start taking  them after the test.  ?  You may take your other medicines as ordered by your doctor.  Call your Doctor if you have:  ? Redness, swelling or pus-like drainage at the biopsy site.  ? Temperature of 100.4 F degrees or higher.  ? Increased pain or tenderness at the biopsy site.  ? Any questions.  Call your Doctor Right Away if you have any of the Signs:  ? Increase in pain in the biopsy site.  ? Dizziness or fainting.  ? Shortness of breath or trouble breathing.  ? Bleeding from the biopsy site.  If you are not able to contact your doctor, call 911 and/or go to the nearest hospital.     How to Reach your Doctor:  Call Dr. Juares at [phone] with problems or questions.

## 2025-05-01 NOTE — Clinical Note
Isthmus cells obtained cuts and pastes/plays cooperatively with others/buttons and zips/runs, balances, jumps/writes in cursive/observes rules/reads

## 2025-05-01 NOTE — NURSING NOTE
END OF PROCEDURE MONITORING CRITERIA  01. BP is within +/- 20% of preprocedure  02. Oxygen sat is at 92% or above on RA, or existing order for O2 treatment, or at pre-sedation levels, otherwise new O2 order needed.  03. Unless the patient has a pre-procedure history of diminished level of consciousness, s/he is easily arousable and when aroused is able to responds appropriately for his/her age.  04. Significant complications related to the specific procedure are absent, have been controlled, or have been evaluated, including:      A. Pain.      B. Wound drainage.      C. All drains and tubes are patent.      D. Nausea and Vomiting. Vomiting is not persisten and has not occurred within 15 minutes prior to discharge.      E. Bladder distention. Voided bladder and/or no symptoms or urinary retention (e.g., bladder distention, frequent voiding in small amounts).      F. Neurovascular status.      G. Level of Consciousness consistent with pre procedural status.        family  affirmed that they were driving the patient home.

## 2025-05-14 LAB
LABORATORY COMMENT REPORT: NORMAL
LABORATORY COMMENT REPORT: NORMAL
PATH REPORT.FINAL DX SPEC: NORMAL
PATH REPORT.GROSS SPEC: NORMAL
PATH REPORT.TOTAL CANCER: NORMAL

## 2025-06-02 DIAGNOSIS — E03.9 HYPOTHYROIDISM (ACQUIRED): ICD-10-CM

## 2025-06-02 RX ORDER — THYROID 60 MG/1
60 TABLET ORAL
Qty: 90 TABLET | Refills: 3 | Status: SHIPPED | OUTPATIENT
Start: 2025-06-02 | End: 2025-06-05 | Stop reason: SDUPTHER

## 2025-06-03 ENCOUNTER — EVALUATION (OUTPATIENT)
Dept: PHYSICAL THERAPY | Facility: CLINIC | Age: 75
End: 2025-06-03
Payer: MEDICARE

## 2025-06-03 DIAGNOSIS — G89.29 CHRONIC CERVICAL PAIN: ICD-10-CM

## 2025-06-03 DIAGNOSIS — G89.29 CHRONIC LOW BACK PAIN, UNSPECIFIED BACK PAIN LATERALITY, UNSPECIFIED WHETHER SCIATICA PRESENT: ICD-10-CM

## 2025-06-03 DIAGNOSIS — G89.29 CHRONIC BILATERAL LOW BACK PAIN: ICD-10-CM

## 2025-06-03 DIAGNOSIS — M54.50 CHRONIC LOW BACK PAIN, UNSPECIFIED BACK PAIN LATERALITY, UNSPECIFIED WHETHER SCIATICA PRESENT: ICD-10-CM

## 2025-06-03 DIAGNOSIS — M54.2 CHRONIC CERVICAL PAIN: ICD-10-CM

## 2025-06-03 DIAGNOSIS — M54.50 CHRONIC BILATERAL LOW BACK PAIN: ICD-10-CM

## 2025-06-03 PROCEDURE — 97163 PT EVAL HIGH COMPLEX 45 MIN: CPT | Mod: GP | Performed by: PHYSICAL THERAPIST

## 2025-06-03 PROCEDURE — 97110 THERAPEUTIC EXERCISES: CPT | Mod: GP | Performed by: PHYSICAL THERAPIST

## 2025-06-03 ASSESSMENT — ENCOUNTER SYMPTOMS
OCCASIONAL FEELINGS OF UNSTEADINESS: 0
DEPRESSION: 0
LOSS OF SENSATION IN FEET: 0
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
## 2025-06-05 DIAGNOSIS — E03.9 HYPOTHYROIDISM (ACQUIRED): ICD-10-CM

## 2025-06-05 RX ORDER — THYROID 60 MG/1
60 TABLET ORAL
Qty: 7 TABLET | Refills: 0 | Status: SHIPPED | OUTPATIENT
Start: 2025-06-05 | End: 2025-06-12

## 2025-06-10 ENCOUNTER — TREATMENT (OUTPATIENT)
Dept: PHYSICAL THERAPY | Facility: CLINIC | Age: 75
End: 2025-06-10
Payer: MEDICARE

## 2025-06-10 DIAGNOSIS — G89.29 CHRONIC CERVICAL PAIN: ICD-10-CM

## 2025-06-10 DIAGNOSIS — M54.50 CHRONIC BILATERAL LOW BACK PAIN: ICD-10-CM

## 2025-06-10 DIAGNOSIS — M54.2 CHRONIC CERVICAL PAIN: ICD-10-CM

## 2025-06-10 DIAGNOSIS — G89.29 CHRONIC BILATERAL LOW BACK PAIN: ICD-10-CM

## 2025-06-10 PROCEDURE — 97110 THERAPEUTIC EXERCISES: CPT | Mod: GP | Performed by: PHYSICAL THERAPIST

## 2025-06-10 PROCEDURE — 97140 MANUAL THERAPY 1/> REGIONS: CPT | Mod: GP | Performed by: PHYSICAL THERAPIST

## 2025-06-10 NOTE — PROGRESS NOTES
"Physical Therapy    Physical Therapy Treatment    Patient Name: Odette Nicholson  MRN: [id number]  Encounter Date: 6/10/2025     Time Calculation  Start Time: 0850  Stop Time: 0930  Time Calculation (min): 40 min    Visit #: 2  out of 10  Insurance: 2025: MEDICARE A/B - NO AUTH / $257 DEDUCT MET / 80% coins / [place] VISITS / $0 USED PT/ST 2025.   FOR LIFE - 100% COVERAGE / PER RTE / ds 6/2/25 /   Evaluation date: 6/3/25    Current Problem:   1. Chronic cervical pain  Follow Up In Physical Therapy      2. Chronic bilateral low back pain  Follow Up In Physical Therapy        SUBJECTIVE:   Pt reports to have had a bad pain weekend that has lingered into the week. Has tried HEP, \"not done very well but done a little.\" Went to chiropractor yesterday and having a deep tissue massage this afternoon. Still having headaches, L sided face pain, and ear pain and they don't know why; ENT says nothing wrong; going back to neurology. Forgot to put on arnica this morning.    Precautions:   Allergies: latex  STEADI Fall Risk: 1 (score of 4+ indicates fall risk)   PMHx considerations: Dextroscoliosis. Multilevel spondylosis with varying degrees of spinal canal and neural foraminal narrowing with vacuum disc phenomenon at L5-S1,  minimal 1-2 mm of retrolisthesis of L1 on L2, and minimal anterolisthesis of L3 on L4; Multilevel cervical sondylosis        Pain:   Start of session: 5-6/10       OBJECTIVE:    Treatments:  Therapeutic Exercise: (32 minutes)  H/L dowel press to overhead flexion 5\" holds x10   H/L TrA brace + hip ADD isometric (yoga block) 3\" x10  H/L TrA brace + hip ABD isometric (belt) 3\" x10  H/L TrA brace + glute set/PPT 3\" x10  H/L TrA brace + B shoulder extension (latex free L2 band) x10  H/L TrA brace + B shoulder horizontal ABD (latex free L2 band) x10  Seated TrA brace + B shoulder rows (latex free L3 band) x10  Verbal review of HEP    Manual Therapy: (8 minutes)  Supine LE long axis distraction L/R    HEP:  Access " "Code: [email or web address]  URL: [email or web address]  Date: 06/03/2025  Prepared by: Clare Jaramillo     Exercises  - Seated Scapular Retraction  - 2 x daily - 7 x weekly - 10 reps - 3 hold  - Supine Cervical Retraction with Towel  - 2 x daily - 7 x weekly - 10 reps - 3 hold  - Supine Chest Stretch with Elbows Bent  - 2 x daily - 7 x weekly - 10 reps - 10 hold  - Supine Transversus Abdominis Bracing - Hands on Stomach  - 2 x daily - 7 x weekly - 10 reps - 5 hold  - Supine Posterior Pelvic Tilt  - 2 x daily - 7 x weekly - 10 reps - 5 hold  - Supine Lower Trunk Rotation  - 2 x daily - 7 x weekly - 10 reps     Patient Education  - Scoliosis  - Lumbar Spondylolisthesis  - Lumbar Spondylosis  - Sleep Positions  - Office Posture    ASSESSMENT:   Pt presents for first follow up. Fair compliance with HEP; encouraged increased performance. Emphasis on core engagement and spinal elongation this date. Some relief with hip and core isometrics.     Post session pain: \" I hurt\"     PLAN:  OP PT PLAN:  Treatment/Interventions: Aquatic Therapy , Cryotherapy , Dry Needling  , Electrical Stimulation , Manual Therapy  , Neuromuscular re-education , Self care/home management , Taping techniques , Therapeutic activities , and Therapeutic exercise    PT Plan: Skilled PT   PT Frequency: 1-2 times per week  Duration: 10 sessions  Insurance: 2025: MEDICARE A/B - NO AUTH / $257 DEDUCT MET / 80% coins / [place] VISITS / $0 USED PT/ST 2025.   FOR LIFE - 100% COVERAGE / PER RTE / ds 6/2/25 /   Certification Period Start Date: 6/3/25  Certification Period End Date: 9/1/25  Rehab Potential: Fair  Plan of Care Agreement: Patient          Goals:   SHORT TERM GOALS   1) Pt will decrease pain from constant 4-10/10 to intermittent 0-4/10 for improved activity tolerance -PROGRESSING  2) Pt will improve standing tolerance from 10 minutes to >20 minutes to improve ability to perform ADLs-PROGRESSING  3) Pt will improve sleep tolerance such that she " remains in bed 100% of the time (as opposed to her recliner) without waking due to pain-PROGRESSING     LONG TERM GOALS  1) Pt will demonstrate independence with HEP for self management of condition at discharge-PROGRESSING  2) Pt will improve cervical and lumbar AROM to painfree and WFL in all planes of motion to allow for ADL/IADL performance without limitation-PROGRESSING  3) Pt will improve standing tolerance from 10 minutes to >30 minutes to improve ability to perform ADLs-PROGRESSING  4) Pt will improve walking tolerance from 1/4 mile to >/= 1/2 mile to improve ability to grocery shop, travel, and hike-PROGRESSING  5) Pt will improve gross B hip strength to 4+/5 to improve standing tolerance -PROGRESSING

## 2025-06-17 ENCOUNTER — TREATMENT (OUTPATIENT)
Dept: PHYSICAL THERAPY | Facility: CLINIC | Age: 75
End: 2025-06-17
Payer: MEDICARE

## 2025-06-17 DIAGNOSIS — G89.29 CHRONIC CERVICAL PAIN: ICD-10-CM

## 2025-06-17 DIAGNOSIS — G89.29 CHRONIC BILATERAL LOW BACK PAIN: ICD-10-CM

## 2025-06-17 DIAGNOSIS — M54.50 CHRONIC BILATERAL LOW BACK PAIN: ICD-10-CM

## 2025-06-17 DIAGNOSIS — M54.2 CHRONIC CERVICAL PAIN: ICD-10-CM

## 2025-06-17 PROCEDURE — 97110 THERAPEUTIC EXERCISES: CPT | Mod: GP | Performed by: PHYSICAL THERAPIST

## 2025-06-17 NOTE — PROGRESS NOTES
"Physical Therapy    Physical Therapy Treatment    Patient Name: Odette Nicholson  MRN: [id number]  Encounter Date: 6/17/2025     Time Calculation  Start Time: 0800  Stop Time: 0840  Time Calculation (min): 40 min    Visit #: 3  out of 10  Insurance: 2025: MEDICARE A/B - NO AUTH / $257 DEDUCT MET / 80% coins / [place] VISITS / $0 USED PT/ST 2025.   FOR LIFE - 100% COVERAGE / PER RTE / ds 6/2/25 /   Evaluation date: 6/3/25    Current Problem:   1. Chronic cervical pain  Follow Up In Physical Therapy      2. Chronic bilateral low back pain  Follow Up In Physical Therapy        SUBJECTIVE:   Having difficulty breathing this morning due to the humidity. Drove to [place] and back Wednesday-Sunday which was good for mental health, but the stress from driving resulted in some increased symptoms. The R foot typically swells but now the L foot is swelling as well. Woke up with the L side hurting this morning. Scheduled for a deep tissue massage this morning after PT.     Precautions:   Allergies: latex  STEADI Fall Risk: 1 (score of 4+ indicates fall risk)   PMHx considerations: Dextroscoliosis. Multilevel spondylosis with varying degrees of spinal canal and neural foraminal narrowing with vacuum disc phenomenon at L5-S1, minimal 1-2 mm of retrolisthesis of L1 on L2, and minimal anterolisthesis of L3 on L4; Multilevel cervical spondylosis        Pain:   Start of session: 6/10 low back L >R       OBJECTIVE:    Treatments:  Therapeutic Exercise: (40 minutes)  NuStep (seat 10, arms 9) L2 x6 minutes  Standing B calf stretch on rockerboard 3x20\"  Lateral walkout + TrA brace (L3 resistance) x10 -- L hip pain, R ear issues resulting in imbalance  Standing B scaption to 90 degrees in mini squat x10  Supine DKTC AAROM with physioball x10  Supine TrA brace + glute set/PPT with feet elevated on physioball, knees extended x10 -- increased L sided LBP  Supine DKTC PROM with physioball -- no relief of back pain  Supine 90/90 spinal " decompression with physioball x2 minutes -- relief  Supine 90/90 R trunk rotation PROM -- relief of L sided LBP    HEP:  Access Code: [email or web address]  URL: [email or web address]  Date: 06/03/2025  Prepared by: Clare Jaramillo     Exercises  - Seated Scapular Retraction  - 2 x daily - 7 x weekly - 10 reps - 3 hold  - Supine Cervical Retraction with Towel  - 2 x daily - 7 x weekly - 10 reps - 3 hold  - Supine Chest Stretch with Elbows Bent  - 2 x daily - 7 x weekly - 10 reps - 10 hold  - Supine Transversus Abdominis Bracing - Hands on Stomach  - 2 x daily - 7 x weekly - 10 reps - 5 hold  - Supine Posterior Pelvic Tilt  - 2 x daily - 7 x weekly - 10 reps - 5 hold  - Supine Lower Trunk Rotation  - 2 x daily - 7 x weekly - 10 reps     Patient Education  - Scoliosis  - Lumbar Spondylolisthesis  - Lumbar Spondylosis  - Sleep Positions  - Office Posture    ASSESSMENT:   Pt with fair tolerance to progressive strengthening this date, but with some slight exacerbation of L sided low back and hip pain, specifically in response to resisted lateral stepping and bridging motion. While spinal flexion did not relieve symptoms, R trunk rotation with L trunk opening resulted in some reduction. LE tremoring noted by end of session during LE AROM, likely multifactorial from muscular fatigue as well as pt reported history of tremors.     Post session pain: denies exacerbation post session     PLAN:  OP PT PLAN:  Treatment/Interventions: Aquatic Therapy , Cryotherapy , Dry Needling  , Electrical Stimulation , Manual Therapy  , Neuromuscular re-education , Self care/home management , Taping techniques , Therapeutic activities , and Therapeutic exercise    PT Plan: Skilled PT   PT Frequency: 1-2 times per week  Duration: 10 sessions  Insurance: 2025: MEDICARE A/B - NO AUTH / $257 DEDUCT MET / 80% coins / [place] VISITS / $0 USED PT/ST 2025.   FOR LIFE - 100% COVERAGE / PER RTE / ds 6/2/25 /   Certification Period Start Date:  6/3/25  Certification Period End Date: 9/1/25  Rehab Potential: Fair  Plan of Care Agreement: Patient          Goals:   SHORT TERM GOALS   1) Pt will decrease pain from constant 4-10/10 to intermittent 0-4/10 for improved activity tolerance -PROGRESSING  2) Pt will improve standing tolerance from 10 minutes to >20 minutes to improve ability to perform ADLs-PROGRESSING  3) Pt will improve sleep tolerance such that she remains in bed 100% of the time (as opposed to her recliner) without waking due to pain-PROGRESSING     LONG TERM GOALS  1) Pt will demonstrate independence with HEP for self management of condition at discharge-PROGRESSING  2) Pt will improve cervical and lumbar AROM to painfree and WFL in all planes of motion to allow for ADL/IADL performance without limitation-PROGRESSING  3) Pt will improve standing tolerance from 10 minutes to >30 minutes to improve ability to perform ADLs-PROGRESSING  4) Pt will improve walking tolerance from 1/4 mile to >/= 1/2 mile to improve ability to grocery shop, travel, and hike-PROGRESSING  5) Pt will improve gross B hip strength to 4+/5 to improve standing tolerance -PROGRESSING

## 2025-06-25 ENCOUNTER — TREATMENT (OUTPATIENT)
Dept: PHYSICAL THERAPY | Facility: CLINIC | Age: 75
End: 2025-06-25
Payer: MEDICARE

## 2025-06-25 ENCOUNTER — APPOINTMENT (OUTPATIENT)
Dept: PHYSICAL THERAPY | Facility: CLINIC | Age: 75
End: 2025-06-25
Payer: MEDICARE

## 2025-06-25 DIAGNOSIS — M54.50 CHRONIC BILATERAL LOW BACK PAIN: ICD-10-CM

## 2025-06-25 DIAGNOSIS — M54.2 CHRONIC CERVICAL PAIN: ICD-10-CM

## 2025-06-25 DIAGNOSIS — G89.29 CHRONIC CERVICAL PAIN: ICD-10-CM

## 2025-06-25 DIAGNOSIS — G89.29 CHRONIC BILATERAL LOW BACK PAIN: ICD-10-CM

## 2025-06-25 PROCEDURE — 97014 ELECTRIC STIMULATION THERAPY: CPT | Mod: GP | Performed by: PHYSICAL THERAPIST

## 2025-06-25 PROCEDURE — 97110 THERAPEUTIC EXERCISES: CPT | Mod: GP | Performed by: PHYSICAL THERAPIST

## 2025-06-25 NOTE — PROGRESS NOTES
"Physical Therapy    Physical Therapy Treatment    Patient Name: Odette Nicholson  MRN: [id number]  Encounter Date: 6/25/2025     Time Calculation  Start Time: 0806  Stop Time: 0842  Time Calculation (min): 36 min    Visit #: 4  out of 10  Insurance: 2025: MEDICARE A/B - NO AUTH / $257 DEDUCT MET / 80% coins / [place] VISITS / $0 USED PT/ST 2025.   FOR LIFE - 100% COVERAGE / PER RTE / ds 6/2/25 /   Evaluation date: 6/3/25    Current Problem:   1. Chronic cervical pain  Follow Up In Physical Therapy      2. Chronic bilateral low back pain  Follow Up In Physical Therapy        SUBJECTIVE:   On a tight schedule today, have a lot to do. Has been having increased tremoring; went back to acupuncturist for treatment yesterday with some reduction in tremoring and headache. Ongoing tinnitus. Had deep tissue massage last week as well as yesterday (usually go on Tuesdays) which are wonderful and make a big difference. Back is about a 5-6/10 this morning; took a shower and put lidocaine on it.     Precautions:   Allergies: latex  STEADI Fall Risk: 1 (score of 4+ indicates fall risk)   PMHx considerations: Dextroscoliosis. Multilevel spondylosis with varying degrees of spinal canal and neural foraminal narrowing with vacuum disc phenomenon at L5-S1, minimal 1-2 mm of retrolisthesis of L1 on L2, and minimal anterolisthesis of L3 on L4; Multilevel cervical spondylosis        Pain:   Start of session: 5-6/10 low back L >R       OBJECTIVE:    Treatments:  Therapeutic Exercise: (26 minutes)  NuStep (seat 10, arms 9) L2 x6 minutes  H/L TrA brace + glute set/PPT 3\" x10  H/L TrA brace + alternating march x10 ea LE -- increased back pain  H/L TrA brace + hip ABD (L2 resistance band) x10 B -- increased back pain  Supine lying with B LE elevated on wedge -- increased back pain  Supine lying with DKTC -- increased back pain  Discussion regarding trial of prone positioning with pillow under abdomen    Electrical Stimulation (10 minutes)  IFC, 4 " electrodes to lumbar spine, pt in R sidelying with pillow between knees, 4000 Hz, 80/150 Hz, 13-14 CV x10 minutes    Education during IFC regarding spinal anatomy, specifically in regards to her most recent imaging, and the role of therapeutic exercise, manual intervention, and alternative pain management options      HEP:  Access Code: [email or web address]  URL: [email or web address]  Date: 06/03/2025  Prepared by: Clare Jaramillo     Exercises  - Seated Scapular Retraction  - 2 x daily - 7 x weekly - 10 reps - 3 hold  - Supine Cervical Retraction with Towel  - 2 x daily - 7 x weekly - 10 reps - 3 hold  - Supine Chest Stretch with Elbows Bent  - 2 x daily - 7 x weekly - 10 reps - 10 hold  - Supine Transversus Abdominis Bracing - Hands on Stomach  - 2 x daily - 7 x weekly - 10 reps - 5 hold  - Supine Posterior Pelvic Tilt  - 2 x daily - 7 x weekly - 10 reps - 5 hold  - Supine Lower Trunk Rotation  - 2 x daily - 7 x weekly - 10 reps     Patient Education  - Scoliosis  - Lumbar Spondylolisthesis  - Lumbar Spondylosis  - Sleep Positions  - Office Posture    ASSESSMENT:   Pt with limited positional tolerance and exercise tolerance this date experiencing an increase in pain with low level core stabilization interventions, spinal decompression positioning, and lumbar flexion biased positioning. Trial of IFC in sidelying without any relief reported.      Post session pain:  increased pain    PLAN:  Next session: assess response to IFC, manual intervention (eg gentle spinal mobilizations and STM), discussion regarding interest in potential referral to aquatics    OP PT PLAN:  Treatment/Interventions: Aquatic Therapy , Cryotherapy , Dry Needling  , Electrical Stimulation , Manual Therapy  , Neuromuscular re-education , Self care/home management , Taping techniques , Therapeutic activities , and Therapeutic exercise    PT Plan: Skilled PT   PT Frequency: 1-2 times per week  Duration: 10 sessions  Insurance: 2025: MEDICARE A/B -  NO AUTH / $257 DEDUCT MET / 80% coins / [place] VISITS / $0 USED PT/ST 2025.   FOR LIFE - 100% COVERAGE / PER RTE / ds 6/2/25 /   Certification Period Start Date: 6/3/25  Certification Period End Date: 9/1/25  Rehab Potential: Fair  Plan of Care Agreement: Patient          Goals:   SHORT TERM GOALS   1) Pt will decrease pain from constant 4-10/10 to intermittent 0-4/10 for improved activity tolerance -PROGRESSING  2) Pt will improve standing tolerance from 10 minutes to >20 minutes to improve ability to perform ADLs-PROGRESSING  3) Pt will improve sleep tolerance such that she remains in bed 100% of the time (as opposed to her recliner) without waking due to pain-PROGRESSING     LONG TERM GOALS  1) Pt will demonstrate independence with HEP for self management of condition at discharge-PROGRESSING  2) Pt will improve cervical and lumbar AROM to painfree and WFL in all planes of motion to allow for ADL/IADL performance without limitation-PROGRESSING  3) Pt will improve standing tolerance from 10 minutes to >30 minutes to improve ability to perform ADLs-PROGRESSING  4) Pt will improve walking tolerance from 1/4 mile to >/= 1/2 mile to improve ability to grocery shop, travel, and hike-PROGRESSING  5) Pt will improve gross B hip strength to 4+/5 to improve standing tolerance -PROGRESSING

## 2025-07-01 ENCOUNTER — TREATMENT (OUTPATIENT)
Dept: PHYSICAL THERAPY | Facility: CLINIC | Age: 75
End: 2025-07-01
Payer: MEDICARE

## 2025-07-01 DIAGNOSIS — M54.50 CHRONIC BILATERAL LOW BACK PAIN: ICD-10-CM

## 2025-07-01 DIAGNOSIS — M54.2 CHRONIC CERVICAL PAIN: ICD-10-CM

## 2025-07-01 DIAGNOSIS — G89.29 CHRONIC CERVICAL PAIN: ICD-10-CM

## 2025-07-01 DIAGNOSIS — G89.29 CHRONIC BILATERAL LOW BACK PAIN: ICD-10-CM

## 2025-07-01 PROCEDURE — 97110 THERAPEUTIC EXERCISES: CPT | Mod: GP | Performed by: PHYSICAL THERAPIST

## 2025-07-01 PROCEDURE — 97140 MANUAL THERAPY 1/> REGIONS: CPT | Mod: GP | Performed by: PHYSICAL THERAPIST

## 2025-07-01 NOTE — PROGRESS NOTES
"Physical Therapy    Physical Therapy Treatment    Patient Name: Odette Nicholson  MRN: [id number]  Encounter Date: 7/1/2025     Time Calculation  Start Time: 0802  Stop Time: 0842  Time Calculation (min): 40 min    Visit #: 5  out of 10  Insurance: 2025: MEDICARE A/B - NO AUTH / $257 DEDUCT MET / 80% coins / [place] VISITS / $0 USED PT/ST 2025.   FOR LIFE - 100% COVERAGE / PER RTE / ds 6/2/25 /   Evaluation date: 6/3/25    Current Problem:   1. Chronic cervical pain  Follow Up In Physical Therapy      2. Chronic bilateral low back pain  Follow Up In Physical Therapy        SUBJECTIVE:   After last session, was in pain for 2 days from the exercises. Tried some stretches without relief. Cannot take any analgesics. Did not believe to experience any relief from IFC last session. This morning, pain is a little less at 4-5/10. Not getting a massage this week or next due to schedule conflicts. Will be traveling to [place] next week for [employer] camp; sleeping arrangements are dorm mattresses but will be bringing own inflatable mattress. Scheduled with chiropractor upon return from camp and a massage the following week.  placed HEP handouts \"in a safe place\" and thus could use an additional copy.     Precautions:   Allergies: latex  STEADI Fall Risk: 1 (score of 4+ indicates fall risk)   PMHx considerations: Dextroscoliosis. Multilevel spondylosis with varying degrees of spinal canal and neural foraminal narrowing with vacuum disc phenomenon at L5-S1, minimal 1-2 mm of retrolisthesis of L1 on L2, and minimal anterolisthesis of L3 on L4; Multilevel cervical spondylosis        Pain:   Start of session: 5-6/10 low back L >R       OBJECTIVE:    Treatments:  Therapeutic Exercise: (10 minutes)  NuStep (seat 10, arms 10) L1 x6.5 minutes  Verbal review of HEP with additional handout provided    Manual Therapy: (30 minutes)  R sidelying STM B lumbosacral paraspinals, L/R lumbopelvic distraction, Gr II-III lumbosacral " /UPAs    HEP:  Access Code: [email or web address]  URL: [email or web address]  Date: 06/03/2025  Prepared by: Clare Jaramillo     Exercises  - Seated Scapular Retraction  - 2 x daily - 7 x weekly - 10 reps - 3 hold  - Supine Cervical Retraction with Towel  - 2 x daily - 7 x weekly - 10 reps - 3 hold  - Supine Chest Stretch with Elbows Bent  - 2 x daily - 7 x weekly - 10 reps - 10 hold  - Supine Transversus Abdominis Bracing - Hands on Stomach  - 2 x daily - 7 x weekly - 10 reps - 5 hold  - Supine Posterior Pelvic Tilt  - 2 x daily - 7 x weekly - 10 reps - 5 hold  - Supine Lower Trunk Rotation  - 2 x daily - 7 x weekly - 10 reps     Patient Education  - Scoliosis  - Lumbar Spondylolisthesis  - Lumbar Spondylosis  - Sleep Positions  - Office Posture    ASSESSMENT:   Pt with limited exercise tolerance last session resulting in symptom exacerbation for multiple days. No relief with IFC. Primary intervention this date was thus focused on manual interventions which provided pt with some symptom relief, especially in response to lumbopelvic distraction in sidelying. Additional handout of HEP provided with encouragement to increase compliance for optimal outcomes.     Post session pain:  decreased pain    PLAN:  Next session: assess response to manual intervention repeating as appropriate, review of HEP via performance based on compliance, discussion regarding interest in potential referral to aquatics    OP PT PLAN:  Treatment/Interventions: Aquatic Therapy , Cryotherapy , Dry Needling  , Electrical Stimulation , Manual Therapy  , Neuromuscular re-education , Self care/home management , Taping techniques , Therapeutic activities , and Therapeutic exercise    PT Plan: Skilled PT   PT Frequency: 1-2 times per week  Duration: 10 sessions  Insurance: 2025: MEDICARE A/B - NO AUTH / $257 DEDUCT MET / 80% coins / [place] VISITS / $0 USED PT/ST 2025.   FOR LIFE - 100% COVERAGE / PER RTE / ds 6/2/25 /   Certification Period  Start Date: 6/3/25  Certification Period End Date: 9/1/25  Rehab Potential: Fair  Plan of Care Agreement: Patient          Goals:   SHORT TERM GOALS   1) Pt will decrease pain from constant 4-10/10 to intermittent 0-4/10 for improved activity tolerance -PROGRESSING  2) Pt will improve standing tolerance from 10 minutes to >20 minutes to improve ability to perform ADLs-PROGRESSING  3) Pt will improve sleep tolerance such that she remains in bed 100% of the time (as opposed to her recliner) without waking due to pain-PROGRESSING     LONG TERM GOALS  1) Pt will demonstrate independence with HEP for self management of condition at discharge-PROGRESSING  2) Pt will improve cervical and lumbar AROM to painfree and WFL in all planes of motion to allow for ADL/IADL performance without limitation-PROGRESSING  3) Pt will improve standing tolerance from 10 minutes to >30 minutes to improve ability to perform ADLs-PROGRESSING  4) Pt will improve walking tolerance from 1/4 mile to >/= 1/2 mile to improve ability to grocery shop, travel, and hike-PROGRESSING  5) Pt will improve gross B hip strength to 4+/5 to improve standing tolerance -PROGRESSING

## 2025-07-15 ENCOUNTER — TREATMENT (OUTPATIENT)
Dept: PHYSICAL THERAPY | Facility: CLINIC | Age: 75
End: 2025-07-15
Payer: MEDICARE

## 2025-07-15 DIAGNOSIS — M54.2 CHRONIC CERVICAL PAIN: ICD-10-CM

## 2025-07-15 DIAGNOSIS — M54.50 CHRONIC BILATERAL LOW BACK PAIN: ICD-10-CM

## 2025-07-15 DIAGNOSIS — G89.29 CHRONIC CERVICAL PAIN: ICD-10-CM

## 2025-07-15 DIAGNOSIS — G89.29 CHRONIC BILATERAL LOW BACK PAIN: ICD-10-CM

## 2025-07-15 PROCEDURE — 97110 THERAPEUTIC EXERCISES: CPT | Mod: GP,CQ

## 2025-07-15 NOTE — PROGRESS NOTES
Physical Therapy    Physical Therapy Treatment    Patient Name: Odette Nicholson  MRN: [id number]  Encounter Date: 7/15/2025     Time Calculation  Start Time: 0805  Stop Time: 0845  Time Calculation (min): 40 min    Visit #: 6  out of 10  Insurance: 2025: MEDICARE A/B - NO AUTH / $257 DEDUCT MET / 80% coins / [place] VISITS / $0 USED PT/ST 2025.   FOR LIFE - 100% COVERAGE / PER RTE / ds 6/2/25 /   Evaluation date: 6/3/25    Current Problem:   1. Chronic cervical pain  Follow Up In Physical Therapy      2. Chronic bilateral low back pain  Follow Up In Physical Therapy        SUBJECTIVE:   Pt voices multiple joint swelling from high sodium diet at [place] last week.  Also Stomach issues from food at camp. Pt did a lot of walking using 2 walking poles with mild increase in lumbar sx's  Intermittent compliance with HEP while at [place]   - Pt had a session with chiropractor yesterday , has an appointment with massage therapist later today.  - Felt a little better after introduction to manual work last PT session       Precautions:   Allergies: latex  STEADI Fall Risk: 1 (score of 4+ indicates fall risk)   PMHx considerations: Dextroscoliosis. Multilevel spondylosis with varying degrees of spinal canal and neural foraminal narrowing with vacuum disc phenomenon at L5-S1, minimal 1-2 mm of retrolisthesis of L1 on L2, and minimal anterolisthesis of L3 on L4; Multilevel cervical spondylosis        Pain:   Start of session: 7-8/10 low back L >R       OBJECTIVE:    Treatments:  Therapeutic Exercise:  40 min  Standing against half foam roller at wall :   -Scap adduction  1 x 10   -Bilateral shoulder horizontal abduction 1 x 10  Standing Lateral hip sag to wall left 5 sec  x 10  NuStep (seat 9, arms 10) L1 x6 minutes  Runner stretch 15 sec x 2 L/R  H/L:  -glute sets 1 x 10   -abd brace 1 x 5   -bent knee fall out 1 x 10 L/R   Standing with UE support on sink:   -hip abd 3 sec 1 x 10 L/R   Seated butterfly chest stretch 5 sec x  10    HEP:  Access Code: [email or web address]  URL: [email or web address]  Date: 06/03/2025  Prepared by: Clare Jaramillo     Exercises  - Seated Scapular Retraction  - 2 x daily - 7 x weekly - 10 reps - 3 hold  - Supine Cervical Retraction with Towel  - 2 x daily - 7 x weekly - 10 reps - 3 hold  - Supine Chest Stretch with Elbows Bent  - 2 x daily - 7 x weekly - 10 reps - 10 hold  - Supine Transversus Abdominis Bracing - Hands on Stomach  - 2 x daily - 7 x weekly - 10 reps - 5 hold  - Supine Posterior Pelvic Tilt  - 2 x daily - 7 x weekly - 10 reps - 5 hold  - Supine Lower Trunk Rotation  - 2 x daily - 7 x weekly - 10 reps     Patient Education  - Scoliosis  - Lumbar Spondylolisthesis  - Lumbar Spondylosis  - Sleep Positions  - Office Posture    ASSESSMENT:   Primary intervention this date was focused on resuming strengthening /mobility activities after limited compliance  with HEP while at [employer] camp . Pt denied increase in sx level with exercise based treatment this session     Post session pain:  decreased pain    PLAN:  review of HEP via performance based on compliance, discussion regarding interest in potential referral to aquatics    OP PT PLAN:  Treatment/Interventions: Aquatic Therapy , Cryotherapy , Dry Needling  , Electrical Stimulation , Manual Therapy  , Neuromuscular re-education , Self care/home management , Taping techniques , Therapeutic activities , and Therapeutic exercise    PT Plan: Skilled PT   PT Frequency: 1-2 times per week  Duration: 10 sessions  Insurance: 2025: MEDICARE A/B - NO AUTH / $257 DEDUCT MET / 80% coins / MN VISITS / $0 USED PT/ST 2025.   FOR LIFE - 100% COVERAGE / PER RTE / ds 6/2/25 /   Certification Period Start Date: 6/3/25  Certification Period End Date: 9/1/25  Rehab Potential: Fair  Plan of Care Agreement: Patient          Goals:   SHORT TERM GOALS   1) Pt will decrease pain from constant 4-10/10 to intermittent 0-4/10 for improved activity tolerance  -PROGRESSING  2) Pt will improve standing tolerance from 10 minutes to >20 minutes to improve ability to perform ADLs-PROGRESSING  3) Pt will improve sleep tolerance such that she remains in bed 100% of the time (as opposed to her recliner) without waking due to pain-PROGRESSING     LONG TERM GOALS  1) Pt will demonstrate independence with HEP for self management of condition at discharge-PROGRESSING  2) Pt will improve cervical and lumbar AROM to painfree and WFL in all planes of motion to allow for ADL/IADL performance without limitation-PROGRESSING  3) Pt will improve standing tolerance from 10 minutes to >30 minutes to improve ability to perform ADLs-PROGRESSING  4) Pt will improve walking tolerance from 1/4 mile to >/= 1/2 mile to improve ability to grocery shop, travel, and hike-PROGRESSING  5) Pt will improve gross B hip strength to 4+/5 to improve standing tolerance -PROGRESSING

## 2025-07-24 DIAGNOSIS — C44.529 SQUAMOUS CELL CARCINOMA OF BACK: ICD-10-CM

## 2025-07-24 DIAGNOSIS — M54.50 CHRONIC BILATERAL LOW BACK PAIN, UNSPECIFIED WHETHER SCIATICA PRESENT: ICD-10-CM

## 2025-07-24 DIAGNOSIS — R06.89 TROUBLE BREATHING: ICD-10-CM

## 2025-07-24 DIAGNOSIS — M54.2 CHRONIC CERVICAL PAIN: ICD-10-CM

## 2025-07-24 DIAGNOSIS — G89.29 CHRONIC CERVICAL PAIN: ICD-10-CM

## 2025-07-24 DIAGNOSIS — G89.29 CHRONIC BILATERAL LOW BACK PAIN, UNSPECIFIED WHETHER SCIATICA PRESENT: ICD-10-CM

## 2025-12-19 ENCOUNTER — APPOINTMENT (OUTPATIENT)
Dept: PULMONOLOGY | Facility: CLINIC | Age: 75
End: 2025-12-19
Payer: MEDICARE

## 2026-03-23 ENCOUNTER — APPOINTMENT (OUTPATIENT)
Dept: ENDOCRINOLOGY | Facility: CLINIC | Age: 76
End: 2026-03-23
Payer: MEDICARE